# Patient Record
Sex: FEMALE | Race: WHITE | NOT HISPANIC OR LATINO | RURAL
[De-identification: names, ages, dates, MRNs, and addresses within clinical notes are randomized per-mention and may not be internally consistent; named-entity substitution may affect disease eponyms.]

---

## 2024-04-24 ENCOUNTER — OFFICE VISIT (OUTPATIENT)
Dept: OBSTETRICS AND GYNECOLOGY | Facility: CLINIC | Age: 34
End: 2024-04-24
Payer: COMMERCIAL

## 2024-04-24 VITALS
DIASTOLIC BLOOD PRESSURE: 86 MMHG | WEIGHT: 123 LBS | HEART RATE: 91 BPM | OXYGEN SATURATION: 99 % | BODY MASS INDEX: 22.63 KG/M2 | HEIGHT: 62 IN | SYSTOLIC BLOOD PRESSURE: 119 MMHG

## 2024-04-24 DIAGNOSIS — Z30.41 ENCOUNTER FOR SURVEILLANCE OF CONTRACEPTIVE PILLS: ICD-10-CM

## 2024-04-24 DIAGNOSIS — Z12.4 CERVICAL CANCER SCREENING: ICD-10-CM

## 2024-04-24 DIAGNOSIS — Z01.419 ROUTINE GYNECOLOGICAL EXAMINATION: Primary | ICD-10-CM

## 2024-04-24 PROCEDURE — 3074F SYST BP LT 130 MM HG: CPT | Mod: ,,, | Performed by: OBSTETRICS & GYNECOLOGY

## 2024-04-24 PROCEDURE — 87624 HPV HI-RISK TYP POOLED RSLT: CPT | Mod: ,,, | Performed by: CLINICAL MEDICAL LABORATORY

## 2024-04-24 PROCEDURE — 99395 PREV VISIT EST AGE 18-39: CPT | Mod: ,,, | Performed by: OBSTETRICS & GYNECOLOGY

## 2024-04-24 PROCEDURE — 1159F MED LIST DOCD IN RCRD: CPT | Mod: ,,, | Performed by: OBSTETRICS & GYNECOLOGY

## 2024-04-24 PROCEDURE — 3079F DIAST BP 80-89 MM HG: CPT | Mod: ,,, | Performed by: OBSTETRICS & GYNECOLOGY

## 2024-04-24 PROCEDURE — 88142 CYTOPATH C/V THIN LAYER: CPT | Mod: TC,GCY | Performed by: OBSTETRICS & GYNECOLOGY

## 2024-04-24 PROCEDURE — 3008F BODY MASS INDEX DOCD: CPT | Mod: ,,, | Performed by: OBSTETRICS & GYNECOLOGY

## 2024-04-24 RX ORDER — NORGESTIMATE AND ETHINYL ESTRADIOL 0.25-0.035
1 KIT ORAL DAILY
Qty: 30 TABLET | Refills: 11 | Status: SHIPPED | OUTPATIENT
Start: 2024-04-24 | End: 2025-04-24

## 2024-04-24 NOTE — PROGRESS NOTES
"CC: Well woman exam    Clemencia Bedolla is a 33 y.o. female  presents for well woman exam.    LMP: Patient's last menstrual period was 2024 (approximate)..    Last mammogram: n/a  Last Colonoscopy: n/a    Denies any issues, problems, or complaints.     Past Medical History:   Diagnosis Date    Anemia      Past Surgical History:   Procedure Laterality Date    DILATION AND CURETTAGE OF UTERUS      REDUCTION OF BOTH BREASTS      REPAIR, LIGAMENT, KNEE, COLLATERAL, ACL, OR PCL       Social History     Socioeconomic History    Marital status:    Tobacco Use    Smoking status: Never     Passive exposure: Never    Smokeless tobacco: Never   Substance and Sexual Activity    Alcohol use: Never    Drug use: Never    Sexual activity: Yes     Partners: Male     Family History   Problem Relation Name Age of Onset    Cancer Paternal Grandfather      Cancer Maternal Grandfather      Epilepsy Mother       OB History          6    Para   3    Term   3            AB   3    Living   3         SAB   3    IAB        Ectopic        Multiple        Live Births   3                 /86   Pulse 91   Ht 5' 2" (1.575 m)   Wt 55.8 kg (123 lb)   LMP 2024 (Approximate)   SpO2 99%   BMI 22.50 kg/m²       ROS:  GENERAL: Denies weight gain or weight loss. Feeling well overall.   SKIN: Denies rash or lesions.   HEAD: Denies head injury or headache.   NODES: Denies enlarged lymph nodes.   CHEST: Denies chest pain or shortness of breath.   CARDIOVASCULAR: Denies palpitations or left sided chest pain.   ABDOMEN: No abdominal pain, constipation, diarrhea, nausea, vomiting or rectal bleeding.   URINARY: No frequency, dysuria, hematuria, or burning on urination.  REPRODUCTIVE: See HPI.   BREASTS: The patient performs breast self-examination and denies pain, lumps, or nipple discharge.   HEMATOLOGIC: No easy bruisability or excessive bleeding.   MUSCULOSKELETAL: Denies joint pain or swelling.   NEUROLOGIC: " Denies syncope or weakness.   PSYCHIATRIC: Denies depression, anxiety or mood swings.    PHYSICAL EXAM:  APPEARANCE: Well nourished, well developed, in no acute distress.  AFFECT: WNL, alert and oriented x 3  SKIN: No acne or hirsutism  NECK: Neck symmetric without masses or thyromegaly  NODES: No inguinal, cervical, axillary, or femoral lymph node enlargement  CHEST: Good respiratory effect  ABDOMEN: Soft.  No tenderness or masses.  No hepatosplenomegaly.  No hernias.  BREASTS: Symmetrical, no skin changes or visible lesions.  No palpable masses, nipple discharge bilaterally.  PELVIC: Normal external genitalia without lesions.  Normal hair distribution.  Adequate perineal body, normal urethral meatus.  Vagina moist and well rugated without lesions or discharge.  Cervix pink, without lesions, discharge or tenderness.  No significant cystocele or rectocele.  Bimanual exam shows uterus to be normal size, regular, mobile and nontender.  Adnexa without masses or tenderness.    EXTREMITIES: No edema.    Routine gynecological examination  -     ThinPrep Pap Test; Future; Expected date: 04/24/2024    Cervical cancer screening  -     ThinPrep Pap Test; Future; Expected date: 04/24/2024    Encounter for surveillance of contraceptive pills  -     norgestimate-ethinyl estradioL (ORTHO-CYCLEN) 0.25-35 mg-mcg per tablet; Take 1 tablet by mouth once daily.  Dispense: 30 tablet; Refill: 11            Patient was counseled today on A.C.S. Pap guidelines and recommendations for yearly pelvic exams, mammograms and monthly self breast exams; to see her PCP for other health maintenance.   Exercise regimen encouraged  Healthy food choices encouraged  Questions answered to desired level of satisfaction  Verbalized understanding to all information and instructions    Follow up in about 1 year (around 4/24/2025) for annual.      Kylee Cano M.D., FCOG    OB/GYN

## 2024-04-26 LAB
GH SERPL-MCNC: NORMAL NG/ML
INSULIN SERPL-ACNC: NORMAL U[IU]/ML
LAB AP CLINICAL INFORMATION: NORMAL
LAB AP GYN INTERPRETATION: NEGATIVE
LAB AP PAP DISCLAIMER COMMENTS: NORMAL
RENIN PLAS-CCNC: NORMAL NG/ML/H

## 2024-04-27 LAB
HPV 16: NEGATIVE
HPV 18: NEGATIVE
HPV OTHER: NEGATIVE

## 2024-08-29 DIAGNOSIS — Z36.89 ENCOUNTER TO ESTABLISH GESTATIONAL AGE USING ULTRASOUND: Primary | ICD-10-CM

## 2024-09-11 ENCOUNTER — OFFICE VISIT (OUTPATIENT)
Dept: OBSTETRICS AND GYNECOLOGY | Facility: CLINIC | Age: 34
End: 2024-09-11
Payer: COMMERCIAL

## 2024-09-11 ENCOUNTER — HOSPITAL ENCOUNTER (OUTPATIENT)
Dept: RADIOLOGY | Facility: HOSPITAL | Age: 34
Discharge: HOME | End: 2024-09-11
Attending: STUDENT IN AN ORGANIZED HEALTH CARE EDUCATION/TRAINING PROGRAM
Payer: COMMERCIAL

## 2024-09-11 VITALS
SYSTOLIC BLOOD PRESSURE: 123 MMHG | DIASTOLIC BLOOD PRESSURE: 72 MMHG | HEIGHT: 62 IN | BODY MASS INDEX: 23.59 KG/M2 | WEIGHT: 128.19 LBS | HEART RATE: 78 BPM

## 2024-09-11 DIAGNOSIS — Z3A.09 9 WEEKS GESTATION OF PREGNANCY: ICD-10-CM

## 2024-09-11 DIAGNOSIS — O09.291 HISTORY OF MISCARRIAGE, CURRENTLY PREGNANT, FIRST TRIMESTER: Primary | ICD-10-CM

## 2024-09-11 DIAGNOSIS — Z11.3 SCREEN FOR STD (SEXUALLY TRANSMITTED DISEASE): ICD-10-CM

## 2024-09-11 DIAGNOSIS — Z36.89 ENCOUNTER TO ESTABLISH GESTATIONAL AGE USING ULTRASOUND: ICD-10-CM

## 2024-09-11 PROCEDURE — 76801 OB US < 14 WKS SINGLE FETUS: CPT | Mod: TC

## 2024-09-11 PROCEDURE — 3074F SYST BP LT 130 MM HG: CPT | Mod: ,,, | Performed by: STUDENT IN AN ORGANIZED HEALTH CARE EDUCATION/TRAINING PROGRAM

## 2024-09-11 PROCEDURE — 99999 PR PBB SHADOW E&M-EST. PATIENT-LVL III: CPT | Mod: PBBFAC,,, | Performed by: STUDENT IN AN ORGANIZED HEALTH CARE EDUCATION/TRAINING PROGRAM

## 2024-09-11 PROCEDURE — 80307 DRUG TEST PRSMV CHEM ANLYZR: CPT | Mod: ,,, | Performed by: CLINICAL MEDICAL LABORATORY

## 2024-09-11 PROCEDURE — 87491 CHLMYD TRACH DNA AMP PROBE: CPT | Mod: ,,, | Performed by: CLINICAL MEDICAL LABORATORY

## 2024-09-11 PROCEDURE — 3078F DIAST BP <80 MM HG: CPT | Mod: ,,, | Performed by: STUDENT IN AN ORGANIZED HEALTH CARE EDUCATION/TRAINING PROGRAM

## 2024-09-11 PROCEDURE — 3008F BODY MASS INDEX DOCD: CPT | Mod: ,,, | Performed by: STUDENT IN AN ORGANIZED HEALTH CARE EDUCATION/TRAINING PROGRAM

## 2024-09-11 PROCEDURE — 0500F INITIAL PRENATAL CARE VISIT: CPT | Mod: S$PBB,,, | Performed by: STUDENT IN AN ORGANIZED HEALTH CARE EDUCATION/TRAINING PROGRAM

## 2024-09-11 PROCEDURE — 87661 TRICHOMONAS VAGINALIS AMPLIF: CPT | Mod: ,,, | Performed by: CLINICAL MEDICAL LABORATORY

## 2024-09-11 PROCEDURE — 87086 URINE CULTURE/COLONY COUNT: CPT | Mod: ,,, | Performed by: CLINICAL MEDICAL LABORATORY

## 2024-09-11 PROCEDURE — 99213 OFFICE O/P EST LOW 20 MIN: CPT | Mod: PBBFAC,25 | Performed by: STUDENT IN AN ORGANIZED HEALTH CARE EDUCATION/TRAINING PROGRAM

## 2024-09-11 PROCEDURE — 87591 N.GONORRHOEAE DNA AMP PROB: CPT | Mod: ,,, | Performed by: CLINICAL MEDICAL LABORATORY

## 2024-09-12 LAB
AMPHET UR QL SCN: NEGATIVE
BARBITURATES UR QL SCN: NEGATIVE
BENZODIAZ METAB UR QL SCN: NEGATIVE
CANNABINOIDS UR QL SCN: NEGATIVE
CHLAMYDIA BY PCR: NEGATIVE
COCAINE UR QL SCN: NEGATIVE
N. GONORRHOEAE (GC) BY PCR: NEGATIVE
OPIATES UR QL SCN: NEGATIVE
PCP UR QL SCN: NEGATIVE
TRICHOMONAS NAT: NEGATIVE

## 2024-09-12 NOTE — PROGRESS NOTES
New OB History and Physical    CC:   Chief Complaint   Patient presents with    pregnancy confirmation     Pt states no concerns at present.         Assessment/Plan:   Clemencia Bedolla is a 34 y.o. at 9w1d who presents for new OB visit    Problem List Items Addressed This Visit    None  Visit Diagnoses       History of miscarriage, currently pregnant, first trimester    -  Primary    Relevant Orders     OB Follow-up Ea Gestation Transabd    Varicella Zoster Antibody, IgG    Hepatitis C Antibody (Completed)    HIV 1/2 Ag/Ab (4th Gen) (Completed)    Type & Screen    Urine culture (Completed)    Treponema Pallidum (Syphillis) Antibody (Completed)    Rubella Antibody Screen (Completed)    Hepatitis B Surface Antigen (Completed)    Basic Metabolic Panel (Completed)    CBC Auto Differential (Completed)    Drug Screen, Urine    9 weeks gestation of pregnancy        Relevant Orders    Varicella Zoster Antibody, IgG    Hepatitis C Antibody (Completed)    HIV 1/2 Ag/Ab (4th Gen) (Completed)    Type & Screen    Urine culture (Completed)    Treponema Pallidum (Syphillis) Antibody (Completed)    Rubella Antibody Screen (Completed)    Hepatitis B Surface Antigen (Completed)    Basic Metabolic Panel (Completed)    CBC Auto Differential (Completed)    Drug Screen, Urine    Screen for STD (sexually transmitted disease)        Relevant Orders    Trichomonas vaginalis by PCR (Completed)    Chlamydia/GC, PCR (Completed)    Drug Screen, Urine            HPI: Clemencia Bedolla is a 34 y.o. at 9w1d who presents for new OB visit.   History of 3 previous miscarriages, most recent two approx 9-10 weeks. 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    Review of Systems: The following ROS was otherwise negative, except as noted in the HPI:  constitutional, HEENT, respiratory, cardiovascular, gastrointestinal, genitourinary, skin, musculoskeletal, neurological, psych    Gynecologic History: Denies hx of abnl pap smears.  No hx  "of STIs.    Obstetrical History:  OB History          7    Para   3    Term   3            AB   3    Living   3         SAB   3    IAB        Ectopic        Multiple        Live Births   3                 Past Medical History:   Past Medical History:   Diagnosis Date    Anemia        Medications:  Medication List with Changes/Refills   Discontinued Medications    NORGESTIMATE-ETHINYL ESTRADIOL (ORTHO-CYCLEN) 0.25-35 MG-MCG PER TABLET    Take 1 tablet by mouth once daily.         Allergies:  Sulfa (sulfonamide antibiotics)    Surgical History:  Past Surgical History:   Procedure Laterality Date    DILATION AND CURETTAGE OF UTERUS      REDUCTION OF BOTH BREASTS      REPAIR, LIGAMENT, KNEE, COLLATERAL, ACL, OR PCL         Family History:  Family History   Problem Relation Name Age of Onset    Cancer Paternal Grandfather      Cancer Maternal Grandfather      Epilepsy Mother         Social History:  Social History     Substance and Sexual Activity   Alcohol Use Never     Social History     Substance and Sexual Activity   Drug Use Never     Social History     Tobacco Use   Smoking Status Never    Passive exposure: Never   Smokeless Tobacco Never       Physical Exam:  /72 (BP Location: Right arm, Patient Position: Sitting)   Pulse 78   Ht 5' 2" (1.575 m)   Wt 58.2 kg (128 lb 3.2 oz)   BMI 23.45 kg/m²     General: Alert, well appearing, no acute distress  Head: Normocephalic, atraumatic  Lungs: unlabored respirations  Abdomen: Gravid, soft, nontender   Pelvic: deferred  Extremities: No redness or tenderness  Skin: Well perfused, normal coloration and turgor, no lesions or rashes visualized  Neuro: Alert, oriented, normal speech, no focal deficits, moves extremities appropriately  Psych: Appropriate, normal affect, appears stated age  Osteopathic: No TART changes      Reviewed frequency of appointments for routine prenatal care, call group partners.  Pregnancy book given, encouraged to read through " for questions regarding food/drink and medication safety in pregnancy.  Encouraged Mychart access.  Instructed to stop by the lab after visit for NOB labwork.    Breastfeeding  - Discussed benefits of breastfeeding for mother and baby and limitations of formula  - Educated mother on milk and milk production  - Encouraged to feed infant on demand  - Needs 8-12 feeds in 24 hrs  - Does not need to go more then 4-5 hours with out a feed  - Reviewed feeding cues, feeding patterns and positions  - Encouraged patient to review pregnancy guide for additional information

## 2024-09-13 ENCOUNTER — PATIENT MESSAGE (OUTPATIENT)
Dept: OBSTETRICS AND GYNECOLOGY | Facility: CLINIC | Age: 34
End: 2024-09-13
Payer: COMMERCIAL

## 2024-09-13 LAB — UA COMPLETE W REFLEX CULTURE PNL UR: ABNORMAL

## 2024-09-23 ENCOUNTER — TELEPHONE (OUTPATIENT)
Dept: OBSTETRICS AND GYNECOLOGY | Facility: CLINIC | Age: 34
End: 2024-09-23
Payer: COMMERCIAL

## 2024-09-23 NOTE — TELEPHONE ENCOUNTER
----- Message from Lory Tirado sent at 9/23/2024 12:42 PM CDT -----  Pt saw that [facility] results are in but she can't get into them on her yancy. Please call  [phone]

## 2024-10-10 ENCOUNTER — ROUTINE PRENATAL (OUTPATIENT)
Dept: OBSTETRICS AND GYNECOLOGY | Facility: CLINIC | Age: 34
End: 2024-10-10
Attending: STUDENT IN AN ORGANIZED HEALTH CARE EDUCATION/TRAINING PROGRAM
Payer: COMMERCIAL

## 2024-10-10 ENCOUNTER — HOSPITAL ENCOUNTER (OUTPATIENT)
Dept: RADIOLOGY | Facility: HOSPITAL | Age: 34
Discharge: HOME | End: 2024-10-10
Attending: STUDENT IN AN ORGANIZED HEALTH CARE EDUCATION/TRAINING PROGRAM
Payer: COMMERCIAL

## 2024-10-10 VITALS
SYSTOLIC BLOOD PRESSURE: 120 MMHG | DIASTOLIC BLOOD PRESSURE: 69 MMHG | HEART RATE: 73 BPM | WEIGHT: 130 LBS | BODY MASS INDEX: 23.78 KG/M2

## 2024-10-10 DIAGNOSIS — O09.291 HISTORY OF MISCARRIAGE, CURRENTLY PREGNANT, FIRST TRIMESTER: Primary | ICD-10-CM

## 2024-10-10 DIAGNOSIS — R35.0 FREQUENCY OF URINATION: ICD-10-CM

## 2024-10-10 DIAGNOSIS — Z3A.13 13 WEEKS GESTATION OF PREGNANCY: ICD-10-CM

## 2024-10-10 DIAGNOSIS — O09.291 HISTORY OF MISCARRIAGE, CURRENTLY PREGNANT, FIRST TRIMESTER: ICD-10-CM

## 2024-10-10 LAB
BILIRUB SERPL-MCNC: NORMAL MG/DL
BLOOD URINE, POC: NORMAL
CLARITY, UA: NORMAL
COLOR, UA: NORMAL
GLUCOSE UR QL STRIP: NORMAL
KETONES UR QL STRIP: NORMAL
LEUKOCYTE ESTERASE URINE, POC: NORMAL
NITRITE, POC UA: NORMAL
PH, POC UA: 7
PROTEIN, POC: NORMAL
SPECIFIC GRAVITY, POC UA: 1
UROBILINOGEN, POC UA: 0.2

## 2024-10-10 PROCEDURE — 76816 OB US FOLLOW-UP PER FETUS: CPT | Mod: TC

## 2024-10-10 PROCEDURE — 0502F SUBSEQUENT PRENATAL CARE: CPT | Mod: ,,, | Performed by: STUDENT IN AN ORGANIZED HEALTH CARE EDUCATION/TRAINING PROGRAM

## 2024-10-10 PROCEDURE — 99999PBSHW POCT URINALYSIS W/O SCOPE: Mod: PBBFAC,,,

## 2024-10-10 PROCEDURE — 99999 PR PBB SHADOW E&M-EST. PATIENT-LVL III: CPT | Mod: PBBFAC,,, | Performed by: STUDENT IN AN ORGANIZED HEALTH CARE EDUCATION/TRAINING PROGRAM

## 2024-10-10 PROCEDURE — 99213 OFFICE O/P EST LOW 20 MIN: CPT | Mod: PBBFAC,25 | Performed by: STUDENT IN AN ORGANIZED HEALTH CARE EDUCATION/TRAINING PROGRAM

## 2024-10-10 PROCEDURE — 81003 URINALYSIS AUTO W/O SCOPE: CPT | Mod: PBBFAC | Performed by: STUDENT IN AN ORGANIZED HEALTH CARE EDUCATION/TRAINING PROGRAM

## 2024-10-10 NOTE — PROGRESS NOTES
Return OB Visit    34 y.o. female  at 13w3d   She has no complaints. Denies any vaginal bleeding, leakage of fluid, cramping, contractions, or pressure.   Total weight gain/weight loss in pregnancy: Not found.     Vitals  BP: 120/69  Pulse: 73  Weight: 59 kg (130 lb)  Prenatal  Fetal Heart Rate: 157    Prenatal Labs:  Lab Results   Component Value Date    HGB 11.9 (L) 2024    HCT 36.4 (L) 2024     2024    HEPBSAG Non-Reactive 2024    [id number] Non-Reactive 2024    LABNGO Negative 2024    LABURIN 20,000 Yeast (A) 2024       A: 13w3d           ICD-10-CM ICD-9-CM    1. History of miscarriage, currently pregnant, first trimester  O09.291 V23.2       2. 13 weeks gestation of pregnancy  Z3A.13 V22.2       3. Frequency of urination  R35.0 788.41 POCT URINALYSIS W/O SCOPE          P: Bleeding, daily fetal kick counts, and  labor/labor precautions discussed.    The following were addressed during this visit:    13-16 Weeks  - Breastfeeding Concerns & Resources   - Importance of Early Skin to Skin Contact       Orders Placed This Encounter   Procedures    POCT URINALYSIS W/O SCOPE       Questions answered to desired level of satisfaction  Verbalized understanding to all information and instructions provided.  Follow up in about 4 weeks (around 2024) for OBV.    Alyse Boswell, DO [facility]BG[facility]

## 2024-11-06 ENCOUNTER — ROUTINE PRENATAL (OUTPATIENT)
Dept: OBSTETRICS AND GYNECOLOGY | Facility: CLINIC | Age: 34
End: 2024-11-06
Payer: COMMERCIAL

## 2024-11-06 VITALS
HEART RATE: 83 BPM | DIASTOLIC BLOOD PRESSURE: 60 MMHG | BODY MASS INDEX: 24.87 KG/M2 | WEIGHT: 136 LBS | SYSTOLIC BLOOD PRESSURE: 123 MMHG

## 2024-11-06 DIAGNOSIS — Z3A.17 17 WEEKS GESTATION OF PREGNANCY: ICD-10-CM

## 2024-11-06 DIAGNOSIS — Z36.89 ENCOUNTER FOR FETAL ANATOMIC SURVEY: ICD-10-CM

## 2024-11-06 DIAGNOSIS — O09.292 HISTORY OF MISCARRIAGE, CURRENTLY PREGNANT, SECOND TRIMESTER: Primary | ICD-10-CM

## 2024-11-06 LAB
BILIRUB SERPL-MCNC: NORMAL MG/DL
BLOOD URINE, POC: NORMAL
CLARITY, UA: NORMAL
COLOR, UA: NORMAL
GLUCOSE UR QL STRIP: NORMAL
KETONES UR QL STRIP: NORMAL
LEUKOCYTE ESTERASE URINE, POC: NORMAL
NITRITE, POC UA: NORMAL
PH, POC UA: 6.5
PROTEIN, POC: NORMAL
SPECIFIC GRAVITY, POC UA: 1.02
UROBILINOGEN, POC UA: 0.2

## 2024-11-06 PROCEDURE — 99213 OFFICE O/P EST LOW 20 MIN: CPT | Mod: PBBFAC | Performed by: STUDENT IN AN ORGANIZED HEALTH CARE EDUCATION/TRAINING PROGRAM

## 2024-11-06 PROCEDURE — 99999 PR PBB SHADOW E&M-EST. PATIENT-LVL III: CPT | Mod: PBBFAC,,, | Performed by: STUDENT IN AN ORGANIZED HEALTH CARE EDUCATION/TRAINING PROGRAM

## 2024-11-06 PROCEDURE — 0502F SUBSEQUENT PRENATAL CARE: CPT | Mod: ,,, | Performed by: STUDENT IN AN ORGANIZED HEALTH CARE EDUCATION/TRAINING PROGRAM

## 2024-11-06 RX ORDER — TERCONAZOLE 8 MG/G
1 CREAM VAGINAL NIGHTLY
Qty: 20 G | Refills: 0 | Status: SHIPPED | OUTPATIENT
Start: 2024-11-06 | End: 2024-11-09

## 2024-11-14 PROBLEM — O09.292 HISTORY OF MISCARRIAGE, CURRENTLY PREGNANT, SECOND TRIMESTER: Status: ACTIVE | Noted: 2024-11-14

## 2024-11-14 NOTE — PROGRESS NOTES
Return OB Visit    34 y.o. female  at 17w2d   She c/o none  Reports fetal movement or fluttering. Denies any vaginal bleeding, leakage of fluid, cramping, contractions, or pressure.   Total weight gain/weight loss in pregnancy: Not found.     Vitals  BP: 123/60  Pulse: 83  Weight: 61.7 kg (136 lb)  Prenatal  Fetal Heart Rate: 150  Movement: Present    Prenatal Labs:  Lab Results   Component Value Date    HGB 11.9 (L) 2024    HCT 36.4 (L) 2024     2024    HEPBSAG Non-Reactive 2024    [id number] Non-Reactive 2024    LABNGO Negative 2024    LABURIN 20,000 Yeast (A) 2024       A: 17w2d           ICD-10-CM ICD-9-CM    1. History of miscarriage, currently pregnant, second trimester  O09.292 V23.2       2. 17 weeks gestation of pregnancy  Z3A.17 V22.2 POCT URINALYSIS W/O SCOPE      3. Encounter for fetal anatomic survey  Z36.89 V28.81 US OB/GYN Procedure (Viewpoint) - Extended List - Future          P: Bleeding, daily fetal kick counts, and  labor/labor precautions discussed.    No pregnancy checklist tasks were completed during this visit, and no tasks are pending completion.      Orders Placed This Encounter   Procedures    POCT URINALYSIS W/O SCOPE    US OB/GYN Procedure (Viewpoint) - Extended List - Future     Standing Status:   Future     Standing Expiration Date:   2025     Order Specific Question:   MIKIE:     Answer:   2025     Order Specific Question:   Procedures to be performed:     Answer:   Transabdominal US     Order Specific Question:   Release to patient     Answer:   Immediate       Questions answered to desired level of satisfaction  Verbalized understanding to all information and instructions provided.  Follow up in about 4 weeks (around 2024) for MAGALIS, anatomy US.    Alyse Boswell, DO [facility]BG[facility]

## 2024-11-15 ENCOUNTER — HOSPITAL ENCOUNTER (EMERGENCY)
Facility: HOSPITAL | Age: 34
Discharge: HOME | End: 2024-11-15
Attending: OBSTETRICS & GYNECOLOGY
Payer: COMMERCIAL

## 2024-11-15 VITALS
SYSTOLIC BLOOD PRESSURE: 107 MMHG | HEART RATE: 79 BPM | TEMPERATURE: 98 F | HEIGHT: 62 IN | RESPIRATION RATE: 20 BRPM | DIASTOLIC BLOOD PRESSURE: 60 MMHG | WEIGHT: 132.19 LBS | BODY MASS INDEX: 24.33 KG/M2
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
## 2024-11-15 DIAGNOSIS — Z3A.18 18 WEEKS GESTATION OF PREGNANCY: Primary | ICD-10-CM

## 2024-11-15 PROCEDURE — 99284 EMERGENCY DEPT VISIT MOD MDM: CPT
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
## 2024-12-02 ENCOUNTER — HOSPITAL ENCOUNTER (OUTPATIENT)
Dept: OBSTETRICS AND GYNECOLOGY | Facility: CLINIC | Age: 34
Discharge: HOME | End: 2024-12-02
Attending: STUDENT IN AN ORGANIZED HEALTH CARE EDUCATION/TRAINING PROGRAM
Payer: COMMERCIAL

## 2024-12-02 ENCOUNTER — ROUTINE PRENATAL (OUTPATIENT)
Dept: OBSTETRICS AND GYNECOLOGY | Facility: CLINIC | Age: 34
End: 2024-12-02
Attending: STUDENT IN AN ORGANIZED HEALTH CARE EDUCATION/TRAINING PROGRAM
Payer: COMMERCIAL

## 2024-12-02 VITALS
BODY MASS INDEX: 25.2 KG/M2 | HEART RATE: 84 BPM | WEIGHT: 137.81 LBS | DIASTOLIC BLOOD PRESSURE: 80 MMHG | SYSTOLIC BLOOD PRESSURE: 112 MMHG

## 2024-12-02 DIAGNOSIS — Z3A.21 21 WEEKS GESTATION OF PREGNANCY: ICD-10-CM

## 2024-12-02 DIAGNOSIS — Z36.89 ENCOUNTER FOR FETAL ANATOMIC SURVEY: ICD-10-CM

## 2024-12-02 DIAGNOSIS — R35.0 FREQUENCY OF URINATION: ICD-10-CM

## 2024-12-02 DIAGNOSIS — O09.292 HISTORY OF MISCARRIAGE, CURRENTLY PREGNANT, SECOND TRIMESTER: Primary | ICD-10-CM

## 2024-12-02 LAB
BILIRUB SERPL-MCNC: NORMAL MG/DL
BLOOD URINE, POC: NORMAL
CLARITY, UA: CLEAR
COLOR, UA: YELLOW
GLUCOSE UR QL STRIP: NORMAL
KETONES UR QL STRIP: NORMAL
LEUKOCYTE ESTERASE URINE, POC: NORMAL
NITRITE, POC UA: NORMAL
PH, POC UA: 5
PROTEIN, POC: NORMAL
SPECIFIC GRAVITY, POC UA: 1.02
UROBILINOGEN, POC UA: 0.2

## 2024-12-02 PROCEDURE — 0502F SUBSEQUENT PRENATAL CARE: CPT | Mod: ,,, | Performed by: STUDENT IN AN ORGANIZED HEALTH CARE EDUCATION/TRAINING PROGRAM

## 2024-12-02 PROCEDURE — 76805 OB US >/= 14 WKS SNGL FETUS: CPT | Mod: 26,,, | Performed by: OBSTETRICS & GYNECOLOGY

## 2024-12-02 PROCEDURE — 99213 OFFICE O/P EST LOW 20 MIN: CPT | Mod: PBBFAC | Performed by: STUDENT IN AN ORGANIZED HEALTH CARE EDUCATION/TRAINING PROGRAM

## 2024-12-02 PROCEDURE — 99999 PR PBB SHADOW E&M-EST. PATIENT-LVL III: CPT | Mod: PBBFAC,,, | Performed by: STUDENT IN AN ORGANIZED HEALTH CARE EDUCATION/TRAINING PROGRAM

## 2024-12-28 NOTE — PROGRESS NOTES
Return OB Visit    34 y.o. female  at 21w0d   She c/o none  Reports fetal movement or fluttering. Denies any vaginal bleeding, leakage of fluid, cramping, contractions, or pressure.   Total weight gain/weight loss in pregnancy: Not found.     Vitals  BP: 112/80  Pulse: 84  Weight: 62.5 kg (137 lb 12.8 oz)  Edema  LLE Edema: None  RLE Edema: None  Facial: None  Additional Edema?: No  FHTs reassuring    Prenatal Labs:  Lab Results   Component Value Date    HGB 11.9 (L) 2024    HCT 36.4 (L) 2024     2024    HEPBSAG Non-Reactive 2024    [id number] Non-Reactive 2024    LABNGO Negative 2024    LABURIN 20,000 Yeast (A) 2024       A: 21w0d           ICD-10-CM ICD-9-CM    1. History of miscarriage, currently pregnant, second trimester  O09.292 V23.2       2. Encounter for fetal anatomic survey  Z36.89 V28.81 US OB/GYN Procedure (Viewpoint) - Extended List - Future      3. Frequency of urination  R35.0 788.41 POCT URINALYSIS W/O SCOPE      4. 21 weeks gestation of pregnancy  Z3A.21 V22.2           P: Bleeding, daily fetal kick counts, and  labor/labor precautions discussed.    No pregnancy checklist tasks were completed during this visit, and no tasks are pending completion.      Orders Placed This Encounter   Procedures    US OB/GYN Procedure (Viewpoint) - Extended List - Future     Standing Status:   Future     Standing Expiration Date:   2025     Order Specific Question:   MIKIE:     Answer:   2025     Order Specific Question:   Procedures to be performed:     Answer:   Ultrasound Follow-up     Order Specific Question:   Release to patient     Answer:   Immediate       Questions answered to desired level of satisfaction  Verbalized understanding to all information and instructions provided.  Follow up in about 4 weeks (around 2024) for MAGALIS, US anatomy f/u.    Alyse Boswell, DO [facility]BG[facility]

## 2024-12-30 ENCOUNTER — HOSPITAL ENCOUNTER (OUTPATIENT)
Dept: OBSTETRICS AND GYNECOLOGY | Facility: CLINIC | Age: 34
Discharge: HOME | End: 2024-12-30
Attending: STUDENT IN AN ORGANIZED HEALTH CARE EDUCATION/TRAINING PROGRAM
Payer: COMMERCIAL

## 2024-12-30 ENCOUNTER — ROUTINE PRENATAL (OUTPATIENT)
Dept: OBSTETRICS AND GYNECOLOGY | Facility: CLINIC | Age: 34
End: 2024-12-30
Attending: STUDENT IN AN ORGANIZED HEALTH CARE EDUCATION/TRAINING PROGRAM
Payer: COMMERCIAL

## 2024-12-30 VITALS
BODY MASS INDEX: 25.35 KG/M2 | SYSTOLIC BLOOD PRESSURE: 103 MMHG | HEART RATE: 72 BPM | DIASTOLIC BLOOD PRESSURE: 67 MMHG | WEIGHT: 138.63 LBS

## 2024-12-30 DIAGNOSIS — O09.292 HISTORY OF MISCARRIAGE, CURRENTLY PREGNANT, SECOND TRIMESTER: Primary | ICD-10-CM

## 2024-12-30 DIAGNOSIS — R82.998 LEUKOCYTES IN URINE: ICD-10-CM

## 2024-12-30 DIAGNOSIS — Z36.89 ENCOUNTER FOR FETAL ANATOMIC SURVEY: ICD-10-CM

## 2024-12-30 DIAGNOSIS — Z3A.25 25 WEEKS GESTATION OF PREGNANCY: ICD-10-CM

## 2024-12-30 DIAGNOSIS — Z34.83 ENCOUNTER FOR SUPERVISION OF OTHER NORMAL PREGNANCY, THIRD TRIMESTER: ICD-10-CM

## 2024-12-30 LAB
BILIRUB SERPL-MCNC: NORMAL MG/DL
BLOOD URINE, POC: NORMAL
CLARITY, UA: NORMAL
COLOR, UA: NORMAL
GLUCOSE UR QL STRIP: NORMAL
KETONES UR QL STRIP: NORMAL
LEUKOCYTE ESTERASE URINE, POC: NORMAL
NITRITE, POC UA: NORMAL
PH, POC UA: 7.5
PROTEIN, POC: NORMAL
SPECIFIC GRAVITY, POC UA: 1.01
UROBILINOGEN, POC UA: 0.2

## 2024-12-30 PROCEDURE — 99213 OFFICE O/P EST LOW 20 MIN: CPT | Mod: PBBFAC | Performed by: STUDENT IN AN ORGANIZED HEALTH CARE EDUCATION/TRAINING PROGRAM

## 2024-12-30 PROCEDURE — 87086 URINE CULTURE/COLONY COUNT: CPT | Mod: ,,, | Performed by: CLINICAL MEDICAL LABORATORY

## 2024-12-30 PROCEDURE — 99999 PR PBB SHADOW E&M-EST. PATIENT-LVL III: CPT | Mod: PBBFAC,,, | Performed by: STUDENT IN AN ORGANIZED HEALTH CARE EDUCATION/TRAINING PROGRAM

## 2024-12-30 PROCEDURE — 0502F SUBSEQUENT PRENATAL CARE: CPT | Mod: ,,, | Performed by: STUDENT IN AN ORGANIZED HEALTH CARE EDUCATION/TRAINING PROGRAM

## 2024-12-30 NOTE — PROGRESS NOTES
Return OB Visit    34 y.o. female  at 25w0d   She c/o none.  Reports good fetal movement or fluttering. Denies any vaginal bleeding, leakage of fluid, cramping, contractions, or pressure.   Total weight gain/weight loss in pregnancy: Not found.     Vitals  BP: 103/67  Pulse: 72  Weight: 62.9 kg (138 lb 9.6 oz)  Prenatal  Fetal Heart Rate: 145  Movement: Present    Prenatal Labs:  Lab Results   Component Value Date    HGB 11.7 (L) 2024    HCT 36.1 (L) 2024     2024    HEPBSAG Non-Reactive 2024    [id number] Non-Reactive 2024    LABNGO Negative 2024    LABURIN 20,000 Yeast (A) 2024       A: 25w0d           ICD-10-CM ICD-9-CM    1. History of miscarriage, currently pregnant, second trimester  O09.292 V23.2       2. 25 weeks gestation of pregnancy  Z3A.25 V22.2 POCT URINALYSIS W/O SCOPE      3. Encounter for supervision of other normal pregnancy, third trimester  Z34.83 V22.1 Treponema Pallidum (Syphillis) Antibody      CBC Auto Differential      4. Leukocytes in urine  R82.998 791.7 Urine culture          P: Bleeding, daily fetal kick counts, and  labor/labor precautions discussed.    No pregnancy checklist tasks were completed during this visit, and no tasks are pending completion.      Orders Placed This Encounter   Procedures    Urine culture           Order Specific Question:   Send normal result to authorizing provider's In Basket if patient is active on MyChart:     Answer:   Yes    Treponema Pallidum (Syphillis) Antibody     Standing Status:   Future     Number of Occurrences:   1     Standing Expiration Date:   3/27/2026    CBC Auto Differential     Standing Status:   Future     Number of Occurrences:   1     Standing Expiration Date:   3/27/2026     Discussed possible MFM referral due to EIF, will notify office  Questions answered to desired level of satisfaction  Verbalized understanding to all information and instructions provided.  Follow up in  about 3 weeks (around 1/20/2025) for ROB. Alyse Boswell, DO [facility]BG[facility]

## 2025-01-01 LAB — UA COMPLETE W REFLEX CULTURE PNL UR: ABNORMAL

## 2025-01-02 ENCOUNTER — PATIENT MESSAGE (OUTPATIENT)
Dept: OBSTETRICS AND GYNECOLOGY | Facility: CLINIC | Age: 35
End: 2025-01-02
Payer: COMMERCIAL

## 2025-01-21 ENCOUNTER — PATIENT MESSAGE (OUTPATIENT)
Dept: OBSTETRICS AND GYNECOLOGY | Facility: CLINIC | Age: 35
End: 2025-01-21
Payer: COMMERCIAL

## 2025-01-22 ENCOUNTER — ROUTINE PRENATAL (OUTPATIENT)
Dept: OBSTETRICS AND GYNECOLOGY | Facility: CLINIC | Age: 35
End: 2025-01-22
Payer: COMMERCIAL

## 2025-01-22 VITALS
HEART RATE: 83 BPM | BODY MASS INDEX: 26.52 KG/M2 | SYSTOLIC BLOOD PRESSURE: 116 MMHG | DIASTOLIC BLOOD PRESSURE: 66 MMHG | WEIGHT: 145 LBS

## 2025-01-22 DIAGNOSIS — Z34.83 ENCOUNTER FOR SUPERVISION OF OTHER NORMAL PREGNANCY, THIRD TRIMESTER: Primary | ICD-10-CM

## 2025-01-22 DIAGNOSIS — Z3A.28 28 WEEKS GESTATION OF PREGNANCY: ICD-10-CM

## 2025-01-22 DIAGNOSIS — R82.998 LEUKOCYTES IN URINE: ICD-10-CM

## 2025-01-22 DIAGNOSIS — Z3A.28 28 WEEKS GESTATION OF PREGNANCY: Primary | ICD-10-CM

## 2025-01-22 DIAGNOSIS — R35.0 FREQUENCY OF URINATION: ICD-10-CM

## 2025-01-22 DIAGNOSIS — Z87.59 HISTORY OF MISCARRIAGE: ICD-10-CM

## 2025-01-22 LAB
BILIRUB SERPL-MCNC: NORMAL MG/DL
BLOOD URINE, POC: NORMAL
CLARITY, UA: CLEAR
COLOR, UA: YELLOW
GLUCOSE UR QL STRIP: NORMAL
KETONES UR QL STRIP: NORMAL
LEUKOCYTE ESTERASE URINE, POC: NORMAL
NITRITE, POC UA: NORMAL
PH, POC UA: 5
PROTEIN, POC: NORMAL
SPECIFIC GRAVITY, POC UA: 1.01
UROBILINOGEN, POC UA: 0.2

## 2025-01-22 PROCEDURE — 0502F SUBSEQUENT PRENATAL CARE: CPT | Mod: ,,, | Performed by: STUDENT IN AN ORGANIZED HEALTH CARE EDUCATION/TRAINING PROGRAM

## 2025-01-22 PROCEDURE — 87086 URINE CULTURE/COLONY COUNT: CPT | Mod: ,,, | Performed by: CLINICAL MEDICAL LABORATORY

## 2025-01-22 PROCEDURE — 99213 OFFICE O/P EST LOW 20 MIN: CPT | Mod: PBBFAC | Performed by: STUDENT IN AN ORGANIZED HEALTH CARE EDUCATION/TRAINING PROGRAM

## 2025-01-22 PROCEDURE — 99999 PR PBB SHADOW E&M-EST. PATIENT-LVL III: CPT | Mod: PBBFAC,,, | Performed by: STUDENT IN AN ORGANIZED HEALTH CARE EDUCATION/TRAINING PROGRAM

## 2025-01-22 PROCEDURE — 87106 FUNGI IDENTIFICATION YEAST: CPT | Mod: ,,, | Performed by: CLINICAL MEDICAL LABORATORY

## 2025-01-23 ENCOUNTER — TELEPHONE (OUTPATIENT)
Dept: OBSTETRICS AND GYNECOLOGY | Facility: CLINIC | Age: 35
End: 2025-01-23
Payer: COMMERCIAL

## 2025-01-23 ENCOUNTER — PATIENT MESSAGE (OUTPATIENT)
Dept: OBSTETRICS AND GYNECOLOGY | Facility: CLINIC | Age: 35
End: 2025-01-23
Payer: COMMERCIAL

## 2025-01-23 RX ORDER — TERCONAZOLE 8 MG/G
1 CREAM VAGINAL NIGHTLY
Qty: 20 G | Refills: 0 | Status: SHIPPED | OUTPATIENT
Start: 2025-01-23 | End: 2025-01-26

## 2025-01-23 NOTE — TELEPHONE ENCOUNTER
----- Message from Yanira sent at 1/21/2025  8:53 AM CST -----  Who Called: Clemencia Bedolla        Preferred Method of Contact: Phone Call  Patient's Preferred Phone Number on File: [phone]     Additional Information: pt needs more info about glucose test on tomorrow. Please provide pt with more info

## 2025-01-24 LAB — UA COMPLETE W REFLEX CULTURE PNL UR: ABNORMAL

## 2025-01-27 ENCOUNTER — TELEPHONE (OUTPATIENT)
Dept: OBSTETRICS AND GYNECOLOGY | Facility: CLINIC | Age: 35
End: 2025-01-27
Payer: COMMERCIAL

## 2025-01-27 ENCOUNTER — PATIENT MESSAGE (OUTPATIENT)
Dept: OBSTETRICS AND GYNECOLOGY | Facility: CLINIC | Age: 35
End: 2025-01-27
Payer: COMMERCIAL

## 2025-01-27 DIAGNOSIS — O99.810 ABNORMAL O'SULLIVAN GLUCOSE CHALLENGE TEST, ANTEPARTUM: Primary | ICD-10-CM

## 2025-01-27 NOTE — TELEPHONE ENCOUNTER
----- Message from Ursula sent at 1/27/2025 10:45 AM CST -----  Who Called: Clemencia Bedolla  Preferred Method of Contact: Phone Call  Patient's Preferred Phone Number on File: [phone]   Best Call Back Number, if different:  Additional Information: pt stated that she failed her 1 hour glucose test and needs to schedule her 3 hour and said she can not come tomorrow or Thursday

## 2025-01-27 NOTE — PROGRESS NOTES
Return OB Visit    34 y.o. female  at 28w2d   She c/o none.  Reports good fetal movement or fluttering. Denies any vaginal bleeding, leakage of fluid, cramping, contractions, or pressure.   Total weight gain/weight loss in pregnancy: Not found.     Vitals  BP: 116/66  Pulse: 83  Weight: 65.8 kg (145 lb)  Prenatal  Fetal Heart Rate: 130  Movement: Present    Prenatal Labs:  Lab Results   Component Value Date    HGB 11.0 (L) 2025    HCT 33.8 (L) 2025     2025    HEPBSAG Non-Reactive 2024    [id number] Non-Reactive 2024    LABNGO Negative 2024    LABURIN 30,000 Yeast species (A) 2025       A: 28w2d           ICD-10-CM ICD-9-CM    1. Encounter for supervision of other normal pregnancy, third trimester  Z34.83 V22.1       2. History of miscarriage  Z87.59 V13.29       3. 28 weeks gestation of pregnancy  Z3A.28 V22.2       4. Frequency of urination  R35.0 788.41 POCT URINALYSIS W/O SCOPE      5. Leukocytes in urine  R82.998 791.7 Urine Culture High Risk          P: Bleeding, daily fetal kick counts, and  labor/labor precautions discussed.    No pregnancy checklist tasks were completed during this visit, and no tasks are pending completion.      Orders Placed This Encounter   Procedures    Urine Culture High Risk           Order Specific Question:   Send normal result to authorizing provider's In Basket if patient is active on MyChart:     Answer:   Yes       Questions answered to desired level of satisfaction  Verbalized understanding to all information and instructions provided.  Follow up in about 2 weeks (around 2025) for ROB. Alyse Boswell, DO [facility]

## 2025-02-05 ENCOUNTER — ROUTINE PRENATAL (OUTPATIENT)
Dept: OBSTETRICS AND GYNECOLOGY | Facility: CLINIC | Age: 35
End: 2025-02-05
Payer: COMMERCIAL

## 2025-02-05 VITALS
HEART RATE: 83 BPM | WEIGHT: 146 LBS | BODY MASS INDEX: 26.7 KG/M2 | SYSTOLIC BLOOD PRESSURE: 113 MMHG | DIASTOLIC BLOOD PRESSURE: 63 MMHG

## 2025-02-05 DIAGNOSIS — Z87.59 HISTORY OF MISCARRIAGE: ICD-10-CM

## 2025-02-05 DIAGNOSIS — R35.0 FREQUENCY OF URINATION: ICD-10-CM

## 2025-02-05 DIAGNOSIS — Z34.83 ENCOUNTER FOR SUPERVISION OF OTHER NORMAL PREGNANCY, THIRD TRIMESTER: Primary | ICD-10-CM

## 2025-02-05 DIAGNOSIS — Z3A.30 30 WEEKS GESTATION OF PREGNANCY: ICD-10-CM

## 2025-02-05 LAB
BILIRUB SERPL-MCNC: NORMAL MG/DL
BLOOD URINE, POC: NORMAL
CLARITY, UA: NORMAL
COLOR, UA: NORMAL
GLUCOSE UR QL STRIP: NORMAL
KETONES UR QL STRIP: NORMAL
LEUKOCYTE ESTERASE URINE, POC: NORMAL
NITRITE, POC UA: NORMAL
PH, POC UA: 5
PROTEIN, POC: NORMAL
SPECIFIC GRAVITY, POC UA: 1.02
UROBILINOGEN, POC UA: NORMAL

## 2025-02-05 PROCEDURE — 0502F SUBSEQUENT PRENATAL CARE: CPT | Mod: ,,, | Performed by: STUDENT IN AN ORGANIZED HEALTH CARE EDUCATION/TRAINING PROGRAM

## 2025-02-05 PROCEDURE — 99999 PR PBB SHADOW E&M-EST. PATIENT-LVL III: CPT | Mod: PBBFAC,,, | Performed by: STUDENT IN AN ORGANIZED HEALTH CARE EDUCATION/TRAINING PROGRAM

## 2025-02-05 PROCEDURE — 99213 OFFICE O/P EST LOW 20 MIN: CPT | Mod: PBBFAC | Performed by: STUDENT IN AN ORGANIZED HEALTH CARE EDUCATION/TRAINING PROGRAM

## 2025-02-05 RX ORDER — LANCETS 28 GAUGE
1 EACH MISCELLANEOUS 4 TIMES DAILY
Qty: 120 EACH | Refills: 2 | Status: SHIPPED | OUTPATIENT
Start: 2025-02-05

## 2025-02-05 RX ORDER — INSULIN PUMP SYRINGE, 3 ML
EACH MISCELLANEOUS
Qty: 1 EACH | Refills: 0 | Status: SHIPPED | OUTPATIENT
Start: 2025-02-05

## 2025-02-05 NOTE — PROGRESS NOTES
Return OB Visit    34 y.o. female  at 30w2d   She has no complaints.   Reports good fetal movement or fluttering. Denies any vaginal bleeding, leakage of fluid, cramping, contractions, or pressure.   Total weight gain/weight loss in pregnancy: Not found.     Vitals  BP: 113/63  Pulse: 83  Weight: 66.2 kg (146 lb)  Prenatal  Movement: Present    FHTs reassuring    Prenatal Labs:  Lab Results   Component Value Date    HGB 11.0 (L) 2025    HCT 33.8 (L) 2025     2025    HEPBSAG Non-Reactive 2024    [id number] Non-Reactive 2024    LABNGO Negative 2024    LABURIN 30,000 Yeast species (A) 2025       A: 30w2d           ICD-10-CM ICD-9-CM    1. Encounter for supervision of other normal pregnancy, third trimester  Z34.83 V22.1       2. History of miscarriage  Z87.59 V13.29       3. 30 weeks gestation of pregnancy  Z3A.30 V22.2       4. Frequency of urination  R35.0 788.41 POCT URINALYSIS W/O SCOPE          P: Bleeding, daily fetal kick counts, and  labor/labor precautions discussed.    No pregnancy checklist tasks were completed during this visit, and no tasks are pending completion.      No orders of the defined types were placed in this encounter.      Questions answered to desired level of satisfaction  Verbalized understanding to all information and instructions provided.  Follow up in about 2 weeks (around 2025) for ROB. Alyse Boswell, DO [facility]

## 2025-02-13 ENCOUNTER — PATIENT MESSAGE (OUTPATIENT)
Dept: OBSTETRICS AND GYNECOLOGY | Facility: CLINIC | Age: 35
End: 2025-02-13
Payer: COMMERCIAL

## 2025-02-19 ENCOUNTER — ROUTINE PRENATAL (OUTPATIENT)
Dept: OBSTETRICS AND GYNECOLOGY | Facility: CLINIC | Age: 35
End: 2025-02-19
Payer: COMMERCIAL

## 2025-02-19 VITALS
WEIGHT: 142 LBS | HEART RATE: 88 BPM | BODY MASS INDEX: 25.97 KG/M2 | SYSTOLIC BLOOD PRESSURE: 132 MMHG | DIASTOLIC BLOOD PRESSURE: 69 MMHG

## 2025-02-19 DIAGNOSIS — Z23 NEED FOR TDAP VACCINATION: ICD-10-CM

## 2025-02-19 DIAGNOSIS — Z3A.32 32 WEEKS GESTATION OF PREGNANCY: ICD-10-CM

## 2025-02-19 DIAGNOSIS — Z87.59 HISTORY OF MISCARRIAGE: ICD-10-CM

## 2025-02-19 DIAGNOSIS — R35.0 FREQUENCY OF URINATION: ICD-10-CM

## 2025-02-19 DIAGNOSIS — Z29.11 NEED FOR RSV VACCINATION: ICD-10-CM

## 2025-02-19 DIAGNOSIS — Z34.83 ENCOUNTER FOR SUPERVISION OF OTHER NORMAL PREGNANCY, THIRD TRIMESTER: Primary | ICD-10-CM

## 2025-02-19 LAB
BILIRUB SERPL-MCNC: NORMAL MG/DL
BLOOD URINE, POC: NORMAL
CLARITY, UA: NORMAL
COLOR, UA: NORMAL
GLUCOSE UR QL STRIP: NORMAL
KETONES UR QL STRIP: NORMAL
LEUKOCYTE ESTERASE URINE, POC: NORMAL
NITRITE, POC UA: NORMAL
PH, POC UA: 7.5
PROTEIN, POC: NORMAL
SPECIFIC GRAVITY, POC UA: 1
UROBILINOGEN, POC UA: 0.2

## 2025-02-19 PROCEDURE — 99213 OFFICE O/P EST LOW 20 MIN: CPT | Mod: PBBFAC | Performed by: STUDENT IN AN ORGANIZED HEALTH CARE EDUCATION/TRAINING PROGRAM

## 2025-02-19 PROCEDURE — 90678 RSV VACC PREF BIVALENT IM: CPT | Mod: PBBFAC | Performed by: STUDENT IN AN ORGANIZED HEALTH CARE EDUCATION/TRAINING PROGRAM

## 2025-02-19 PROCEDURE — 0502F SUBSEQUENT PRENATAL CARE: CPT | Mod: ,,, | Performed by: STUDENT IN AN ORGANIZED HEALTH CARE EDUCATION/TRAINING PROGRAM

## 2025-02-19 PROCEDURE — 90472 IMMUNIZATION ADMIN EACH ADD: CPT | Mod: PBBFAC | Performed by: STUDENT IN AN ORGANIZED HEALTH CARE EDUCATION/TRAINING PROGRAM

## 2025-02-19 PROCEDURE — 99999PBSHW PR PBB SHADOW TECHNICAL ONLY FILED TO HB: Mod: PBBFAC,,,

## 2025-02-19 PROCEDURE — 99999 PR PBB SHADOW E&M-EST. PATIENT-LVL III: CPT | Mod: PBBFAC,,, | Performed by: STUDENT IN AN ORGANIZED HEALTH CARE EDUCATION/TRAINING PROGRAM

## 2025-02-19 PROCEDURE — 90471 IMMUNIZATION ADMIN: CPT | Mod: PBBFAC | Performed by: STUDENT IN AN ORGANIZED HEALTH CARE EDUCATION/TRAINING PROGRAM

## 2025-02-19 PROCEDURE — 90715 TDAP VACCINE 7 YRS/> IM: CPT | Mod: PBBFAC | Performed by: STUDENT IN AN ORGANIZED HEALTH CARE EDUCATION/TRAINING PROGRAM

## 2025-02-19 RX ADMIN — RESPIRATORY SYNCYTIAL VIRUS VACCINE 120 MCG: KIT at 04:02

## 2025-02-19 RX ADMIN — CLOSTRIDIUM TETANI TOXOID ANTIGEN (FORMALDEHYDE INACTIVATED), CORYNEBACTERIUM DIPHTHERIAE TOXOID ANTIGEN (FORMALDEHYDE INACTIVATED), BORDETELLA PERTUSSIS TOXOID ANTIGEN (GLUTARALDEHYDE INACTIVATED), BORDETELLA PERTUSSIS FILAMENTOUS HEMAGGLUTININ ANTIGEN (FORMALDEHYDE INACTIVATED), BORDETELLA PERTUSSIS PERTACTIN ANTIGEN, AND BORDETELLA PERTUSSIS FIMBRIAE 2/3 ANTIGEN 0.5 ML: 5; 2; 2.5; 5; 3; 5 INJECTION, SUSPENSION INTRAMUSCULAR at 04:02

## 2025-02-19 NOTE — PROGRESS NOTES
Return OB Visit    34 y.o. female  at 32w2d   She has no complaints.   Reports good fetal movement or fluttering. Denies any vaginal bleeding, leakage of fluid, cramping, contractions, or pressure.   Total weight gain/weight loss in pregnancy: Not found.     Vitals  BP: 132/69  Pulse: 88  Weight: 64.4 kg (142 lb)  Prenatal  Fundal Height (cm): 33 cm  Fetal Heart Rate: 140  Movement: Present    Prenatal Labs:  Lab Results   Component Value Date    HGB 11.0 (L) 2025    HCT 33.8 (L) 2025     2025    HEPBSAG Non-Reactive 2024    [id number] Non-Reactive 2024    LABNGO Negative 2024    LABURIN 30,000 Yeast species (A) 2025       A: 32w2d           ICD-10-CM ICD-9-CM    1. Encounter for supervision of other normal pregnancy, third trimester  Z34.83 V22.1       2. History of miscarriage  Z87.59 V13.29       3. 32 weeks gestation of pregnancy  Z3A.32 V22.2       4. Frequency of urination  R35.0 788.41 POCT URINALYSIS W/O SCOPE      5. Need for RSV vaccination  Z29.11 V04.82 RSV, preF A and preF B(PF) (Abrysvo) vaccine 120 mcg      6. Need for Tdap vaccination  Z23 V06.1 DIPH,PERTUSS(ACEL),TET VAC(PF)(ADULT)(ADACEL)(TDaP)          P: Bleeding, daily fetal kick counts, and  labor/labor precautions discussed.    The following were addressed during this visit:    29-32 Weeks  - Tdap Given   - Circumsision plans   - Birth Plan   - Fetal Kick Counts/PIH/PTL precautions   - Quiet time       No orders of the defined types were placed in this encounter.      Questions answered to desired level of satisfaction  Verbalized understanding to all information and instructions provided.  Follow up in about 2 weeks (around 3/5/2025) for ROB. Alyse Boswell, DO [facility]

## 2025-03-05 ENCOUNTER — ROUTINE PRENATAL (OUTPATIENT)
Dept: OBSTETRICS AND GYNECOLOGY | Facility: CLINIC | Age: 35
End: 2025-03-05
Payer: COMMERCIAL

## 2025-03-05 VITALS
BODY MASS INDEX: 26.52 KG/M2 | SYSTOLIC BLOOD PRESSURE: 101 MMHG | HEART RATE: 96 BPM | WEIGHT: 145 LBS | DIASTOLIC BLOOD PRESSURE: 70 MMHG

## 2025-03-05 DIAGNOSIS — Z87.59 HISTORY OF MISCARRIAGE: ICD-10-CM

## 2025-03-05 DIAGNOSIS — R35.0 FREQUENCY OF URINATION: ICD-10-CM

## 2025-03-05 DIAGNOSIS — Z36.89 ENCOUNTER FOR ULTRASOUND TO ASSESS FETAL GROWTH: ICD-10-CM

## 2025-03-05 DIAGNOSIS — R82.998 LEUKOCYTES IN URINE: ICD-10-CM

## 2025-03-05 DIAGNOSIS — Z3A.34 34 WEEKS GESTATION OF PREGNANCY: ICD-10-CM

## 2025-03-05 DIAGNOSIS — Z34.83 ENCOUNTER FOR SUPERVISION OF OTHER NORMAL PREGNANCY, THIRD TRIMESTER: Primary | ICD-10-CM

## 2025-03-05 LAB
BILIRUB SERPL-MCNC: NORMAL MG/DL
BLOOD URINE, POC: NORMAL
CLARITY, UA: NORMAL
COLOR, UA: NORMAL
GLUCOSE UR QL STRIP: NORMAL
KETONES UR QL STRIP: NORMAL
LEUKOCYTE ESTERASE URINE, POC: NORMAL
NITRITE, POC UA: NORMAL
PH, POC UA: 5
PROTEIN, POC: NORMAL
SPECIFIC GRAVITY, POC UA: 1.01
UROBILINOGEN, POC UA: 0.2

## 2025-03-05 PROCEDURE — 99213 OFFICE O/P EST LOW 20 MIN: CPT | Mod: PBBFAC | Performed by: STUDENT IN AN ORGANIZED HEALTH CARE EDUCATION/TRAINING PROGRAM

## 2025-03-05 PROCEDURE — 99999 PR PBB SHADOW E&M-EST. PATIENT-LVL III: CPT | Mod: PBBFAC,,, | Performed by: STUDENT IN AN ORGANIZED HEALTH CARE EDUCATION/TRAINING PROGRAM

## 2025-03-05 PROCEDURE — 87086 URINE CULTURE/COLONY COUNT: CPT | Mod: ,,, | Performed by: CLINICAL MEDICAL LABORATORY

## 2025-03-05 PROCEDURE — 0502F SUBSEQUENT PRENATAL CARE: CPT | Mod: ,,, | Performed by: STUDENT IN AN ORGANIZED HEALTH CARE EDUCATION/TRAINING PROGRAM

## 2025-03-07 LAB — UA COMPLETE W REFLEX CULTURE PNL UR: NORMAL

## 2025-03-19 ENCOUNTER — HOSPITAL ENCOUNTER (OUTPATIENT)
Dept: OBSTETRICS AND GYNECOLOGY | Facility: CLINIC | Age: 35
Discharge: HOME | End: 2025-03-19
Attending: STUDENT IN AN ORGANIZED HEALTH CARE EDUCATION/TRAINING PROGRAM
Payer: COMMERCIAL

## 2025-03-19 ENCOUNTER — ROUTINE PRENATAL (OUTPATIENT)
Dept: OBSTETRICS AND GYNECOLOGY | Facility: CLINIC | Age: 35
End: 2025-03-19
Payer: COMMERCIAL

## 2025-03-19 VITALS
DIASTOLIC BLOOD PRESSURE: 74 MMHG | BODY MASS INDEX: 26.7 KG/M2 | HEART RATE: 78 BPM | WEIGHT: 146 LBS | SYSTOLIC BLOOD PRESSURE: 116 MMHG

## 2025-03-19 DIAGNOSIS — R35.0 FREQUENCY OF URINATION: ICD-10-CM

## 2025-03-19 DIAGNOSIS — Z34.83 ENCOUNTER FOR SUPERVISION OF OTHER NORMAL PREGNANCY, THIRD TRIMESTER: Primary | ICD-10-CM

## 2025-03-19 DIAGNOSIS — Z36.89 ENCOUNTER FOR ULTRASOUND TO ASSESS FETAL GROWTH: ICD-10-CM

## 2025-03-19 DIAGNOSIS — Z11.3 SCREEN FOR STD (SEXUALLY TRANSMITTED DISEASE): ICD-10-CM

## 2025-03-19 DIAGNOSIS — R82.998 LEUKOCYTES IN URINE: ICD-10-CM

## 2025-03-19 DIAGNOSIS — Z87.59 HISTORY OF MISCARRIAGE: ICD-10-CM

## 2025-03-19 DIAGNOSIS — Z3A.36 36 WEEKS GESTATION OF PREGNANCY: ICD-10-CM

## 2025-03-19 LAB
BILIRUB SERPL-MCNC: NORMAL MG/DL
BLOOD URINE, POC: NORMAL
CLARITY, UA: CLEAR
COLOR, UA: YELLOW
GLUCOSE UR QL STRIP: NORMAL
KETONES UR QL STRIP: NORMAL
LEUKOCYTE ESTERASE URINE, POC: NORMAL
NITRITE, POC UA: NORMAL
PH, POC UA: 7
PROTEIN, POC: NORMAL
SPECIFIC GRAVITY, POC UA: 1.01
UROBILINOGEN, POC UA: 0.2

## 2025-03-19 PROCEDURE — 87653 STREP B DNA AMP PROBE: CPT | Mod: ,,, | Performed by: CLINICAL MEDICAL LABORATORY

## 2025-03-19 PROCEDURE — 87591 N.GONORRHOEAE DNA AMP PROB: CPT | Mod: ,,, | Performed by: CLINICAL MEDICAL LABORATORY

## 2025-03-19 PROCEDURE — 0502F SUBSEQUENT PRENATAL CARE: CPT | Mod: ,,, | Performed by: STUDENT IN AN ORGANIZED HEALTH CARE EDUCATION/TRAINING PROGRAM

## 2025-03-19 PROCEDURE — 99213 OFFICE O/P EST LOW 20 MIN: CPT | Mod: PBBFAC | Performed by: STUDENT IN AN ORGANIZED HEALTH CARE EDUCATION/TRAINING PROGRAM

## 2025-03-19 PROCEDURE — 99999 PR PBB SHADOW E&M-EST. PATIENT-LVL III: CPT | Mod: PBBFAC,,, | Performed by: STUDENT IN AN ORGANIZED HEALTH CARE EDUCATION/TRAINING PROGRAM

## 2025-03-19 PROCEDURE — 76816 OB US FOLLOW-UP PER FETUS: CPT | Mod: 26,,, | Performed by: OBSTETRICS & GYNECOLOGY

## 2025-03-19 PROCEDURE — 87086 URINE CULTURE/COLONY COUNT: CPT | Mod: ,,, | Performed by: CLINICAL MEDICAL LABORATORY

## 2025-03-19 PROCEDURE — 87491 CHLMYD TRACH DNA AMP PROBE: CPT | Mod: ,,, | Performed by: CLINICAL MEDICAL LABORATORY

## 2025-03-20 LAB
CHLAMYDIA BY PCR: NEGATIVE
GROUP B STREP, PCR: POSITIVE
N. GONORRHOEAE (GC) BY PCR: NEGATIVE

## 2025-03-21 LAB — UA COMPLETE W REFLEX CULTURE PNL UR: NORMAL

## 2025-03-24 ENCOUNTER — RESULTS FOLLOW-UP (OUTPATIENT)
Dept: OBSTETRICS AND GYNECOLOGY | Facility: CLINIC | Age: 35
End: 2025-03-24

## 2025-03-24 NOTE — PROGRESS NOTES
Return OB Visit    34 y.o. female  at 34w2d   She c/o dryness, itching.  Reports good fetal movement or fluttering. Denies any vaginal bleeding, leakage of fluid, cramping, contractions, or pressure.   Total weight gain/weight loss in pregnancy: Not found.     Vitals  BP: 101/70  Pulse: 96  Weight: 65.8 kg (145 lb)  Prenatal  Fundal Height (cm): 35 cm  Fetal Heart Rate: 130  Movement: Present    Prenatal Labs:  Lab Results   Component Value Date    HGB 10.9 (L) 2025    HCT 34.4 (L) 2025     2025    HEPBSAG Non-Reactive 2024    [id number] Non-Reactive 2024    LABNGO Negative 2025    LABURIN Skin/Urogenital Vero Isolated, no further workup. 2025       A: 34w6d           ICD-10-CM ICD-9-CM    1. Encounter for supervision of other normal pregnancy, third trimester  Z34.83 V22.1       2. Frequency of urination  R35.0 788.41 POCT URINALYSIS W/O SCOPE      3. History of miscarriage  Z87.59 V13.29       4. 34 weeks gestation of pregnancy  Z3A.34 V22.2       5. Encounter for ultrasound to assess fetal growth  Z36.89 V28.3 US OB/GYN Procedure ([facility]) - Extended List      6. Leukocytes in urine  R82.998 791.7 Urine Culture High Risk          P: Bleeding, daily fetal kick counts, and  labor/labor precautions discussed.    No pregnancy checklist tasks were completed during this visit, and no tasks are pending completion.      Orders Placed This Encounter   Procedures    Urine Culture High Risk           Send normal result to authorizing provider's In Basket if patient is active on MyChart::   Yes    US OB/GYN Procedure ([facility]) - Extended List     Standing Status:   Future     Number of Occurrences:   1     Expiration Date:   3/5/2026     MIKIE::   2025     Procedures to be performed::   Ultrasound Follow-up     Release to patient:   Immediate       Questions answered to desired level of satisfaction  Verbalized understanding to all information and  instructions provided.  Follow up in about 2 weeks (around 3/19/2025) for ryan BLANCAS.    Alyse Boswell, DO [facility]

## 2025-03-25 NOTE — PROGRESS NOTES
Return OB Visit    34 y.o. female  at 36w2d   She c/o none.  Reports good fetal movement or fluttering. Denies any vaginal bleeding, leakage of fluid, cramping, contractions, or pressure.   Total weight gain/weight loss in pregnancy: Not found.     Vitals  BP: 116/74  Pulse: 78  Weight: 66.2 kg (146 lb)  Prenatal  Movement: Present    Prenatal Labs:  Lab Results   Component Value Date    HGB 10.9 (L) 2025    HCT 34.4 (L) 2025     2025    HEPBSAG Non-Reactive 2024    [id number] Non-Reactive 2024    LABNGO Negative 2025    LABURIN Skin/Urogenital Vero Isolated, no further workup. 2025       A: 36w6d           ICD-10-CM ICD-9-CM    1. Encounter for supervision of other normal pregnancy, third trimester  Z34.83 V22.1       2. History of miscarriage  Z87.59 V13.29       3. 36 weeks gestation of pregnancy  Z3A.36 V22.2 Strep B Screen, Antepartum      CBC Auto Differential      4. Frequency of urination  R35.0 788.41 POCT URINALYSIS W/O SCOPE      5. Screen for STD (sexually transmitted disease)  Z11.3 V74.5 Chlamydia/GC, PCR      6. Leukocytes in urine  R82.998 791.7 Urine Culture High Risk          P: Bleeding, daily fetal kick counts, and  labor/labor precautions discussed.    No pregnancy checklist tasks were completed during this visit, and no tasks are pending completion.      Orders Placed This Encounter   Procedures    Strep B Screen, Antepartum     Allergic to Penicillin:   No    Chlamydia/GC, PCR     Release to patient:   Immediate     Send normal result to authorizing provider's In Basket if patient is active on MyChart::   Yes    Urine Culture High Risk           Send normal result to authorizing provider's In Basket if patient is active on MyChart::   Yes    CBC Auto Differential     Standing Status:   Future     Number of Occurrences:   1     Expected Date:   3/19/2025     Expiration Date:   2026     Send normal result to authorizing  provider's In Basket if patient is active on MyChart::   Yes       Questions answered to desired level of satisfaction  Verbalized understanding to all information and instructions provided.  Follow up in about 1 week (around 3/26/2025) for ROB. Alyse Boswell, DO [facility]

## 2025-03-26 ENCOUNTER — ROUTINE PRENATAL (OUTPATIENT)
Dept: OBSTETRICS AND GYNECOLOGY | Facility: CLINIC | Age: 35
End: 2025-03-26
Payer: COMMERCIAL

## 2025-03-26 VITALS
DIASTOLIC BLOOD PRESSURE: 69 MMHG | BODY MASS INDEX: 26.7 KG/M2 | WEIGHT: 146 LBS | HEART RATE: 82 BPM | SYSTOLIC BLOOD PRESSURE: 105 MMHG

## 2025-03-26 DIAGNOSIS — Z34.83 ENCOUNTER FOR SUPERVISION OF OTHER NORMAL PREGNANCY, THIRD TRIMESTER: Primary | ICD-10-CM

## 2025-03-26 DIAGNOSIS — Z3A.37 37 WEEKS GESTATION OF PREGNANCY: ICD-10-CM

## 2025-03-26 DIAGNOSIS — Z87.59 HISTORY OF MISCARRIAGE: ICD-10-CM

## 2025-03-26 DIAGNOSIS — R82.998 LEUKOCYTES IN URINE: ICD-10-CM

## 2025-03-26 DIAGNOSIS — R35.0 FREQUENCY OF URINATION: ICD-10-CM

## 2025-03-26 PROCEDURE — 0502F SUBSEQUENT PRENATAL CARE: CPT | Mod: ,,, | Performed by: STUDENT IN AN ORGANIZED HEALTH CARE EDUCATION/TRAINING PROGRAM

## 2025-03-26 PROCEDURE — 87086 URINE CULTURE/COLONY COUNT: CPT | Mod: ,,, | Performed by: CLINICAL MEDICAL LABORATORY

## 2025-03-26 PROCEDURE — 99213 OFFICE O/P EST LOW 20 MIN: CPT | Mod: PBBFAC | Performed by: STUDENT IN AN ORGANIZED HEALTH CARE EDUCATION/TRAINING PROGRAM

## 2025-03-26 PROCEDURE — 99999 PR PBB SHADOW E&M-EST. PATIENT-LVL III: CPT | Mod: PBBFAC,,, | Performed by: STUDENT IN AN ORGANIZED HEALTH CARE EDUCATION/TRAINING PROGRAM

## 2025-03-28 LAB — UA COMPLETE W REFLEX CULTURE PNL UR: ABNORMAL

## 2025-03-28 NOTE — PROGRESS NOTES
Return OB Visit    34 y.o. female  at 37w2d   She c/o none.  Reports good fetal movement or fluttering. Denies any vaginal bleeding, leakage of fluid, cramping, contractions, or pressure.   Total weight gain/weight loss in pregnancy: Not found.     Vitals  BP: 105/69  Pulse: 82  Weight: 66.2 kg (146 lb)  Prenatal  Fetal Heart Rate: 120  Movement: Present    Prenatal Labs:  Lab Results   Component Value Date    HGB 10.9 (L) 2025    HCT 34.4 (L) 2025     2025    HEPBSAG Non-Reactive 2024    [id number] Non-Reactive 2024    LABNGO Negative 2025    LABURIN No Growth To Date 2025       A: 37w3d           ICD-10-CM ICD-9-CM    1. Encounter for supervision of other normal pregnancy, third trimester  Z34.83 V22.1       2. History of miscarriage  Z87.59 V13.29       3. 37 weeks gestation of pregnancy  Z3A.37 V22.2       4. Frequency of urination  R35.0 788.41 POCT URINALYSIS W/O SCOPE      5. Leukocytes in urine  R82.998 791.7 Urine Culture High Risk          P: Bleeding, daily fetal kick counts, and  labor/labor precautions discussed.    No pregnancy checklist tasks were completed during this visit, and no tasks are pending completion.      Orders Placed This Encounter   Procedures    Urine Culture High Risk           Send normal result to authorizing provider's In Basket if patient is active on MyChart::   Yes       Questions answered to desired level of satisfaction  Verbalized understanding to all information and instructions provided.  Follow up in about 1 week (around 2025) for ROB. Alyse Boswell, DO [facility]

## 2025-04-02 ENCOUNTER — ROUTINE PRENATAL (OUTPATIENT)
Dept: OBSTETRICS AND GYNECOLOGY | Facility: CLINIC | Age: 35
End: 2025-04-02
Payer: COMMERCIAL

## 2025-04-02 VITALS
HEART RATE: 77 BPM | BODY MASS INDEX: 26.89 KG/M2 | DIASTOLIC BLOOD PRESSURE: 81 MMHG | SYSTOLIC BLOOD PRESSURE: 113 MMHG | WEIGHT: 147 LBS

## 2025-04-02 DIAGNOSIS — Z3A.38 38 WEEKS GESTATION OF PREGNANCY: ICD-10-CM

## 2025-04-02 DIAGNOSIS — Z87.59 HISTORY OF MISCARRIAGE: ICD-10-CM

## 2025-04-02 DIAGNOSIS — Z34.83 ENCOUNTER FOR SUPERVISION OF OTHER NORMAL PREGNANCY, THIRD TRIMESTER: Primary | ICD-10-CM

## 2025-04-02 DIAGNOSIS — R82.998 LEUKOCYTES IN URINE: ICD-10-CM

## 2025-04-02 DIAGNOSIS — Z34.90 ENCOUNTER FOR ELECTIVE INDUCTION OF LABOR: ICD-10-CM

## 2025-04-02 DIAGNOSIS — R35.0 FREQUENCY OF URINATION: ICD-10-CM

## 2025-04-02 LAB
BILIRUB SERPL-MCNC: NORMAL MG/DL
BLOOD URINE, POC: NORMAL
CLARITY, UA: NORMAL
COLOR, UA: NORMAL
GLUCOSE UR QL STRIP: NORMAL
KETONES UR QL STRIP: NORMAL
LEUKOCYTE ESTERASE URINE, POC: NORMAL
NITRITE, POC UA: NORMAL
PH, POC UA: 6
PROTEIN, POC: NORMAL
SPECIFIC GRAVITY, POC UA: 1.01
UROBILINOGEN, POC UA: 0.2

## 2025-04-02 PROCEDURE — 87086 URINE CULTURE/COLONY COUNT: CPT | Mod: ,,, | Performed by: CLINICAL MEDICAL LABORATORY

## 2025-04-02 PROCEDURE — 0502F SUBSEQUENT PRENATAL CARE: CPT | Mod: ,,, | Performed by: STUDENT IN AN ORGANIZED HEALTH CARE EDUCATION/TRAINING PROGRAM

## 2025-04-02 PROCEDURE — 99999 PR PBB SHADOW E&M-EST. PATIENT-LVL III: CPT | Mod: PBBFAC,,, | Performed by: STUDENT IN AN ORGANIZED HEALTH CARE EDUCATION/TRAINING PROGRAM

## 2025-04-02 PROCEDURE — 99213 OFFICE O/P EST LOW 20 MIN: CPT | Mod: PBBFAC | Performed by: STUDENT IN AN ORGANIZED HEALTH CARE EDUCATION/TRAINING PROGRAM

## 2025-04-02 RX ORDER — MISOPROSTOL 200 UG/1
800 TABLET ORAL ONCE AS NEEDED
OUTPATIENT
Start: 2025-04-02

## 2025-04-02 RX ORDER — SIMETHICONE 80 MG
1 TABLET,CHEWABLE ORAL 4 TIMES DAILY PRN
OUTPATIENT
Start: 2025-04-02

## 2025-04-02 RX ORDER — OXYTOCIN/0.9 % SODIUM CHLORIDE 15/250 ML
95 PLASTIC BAG, INJECTION (ML) INTRAVENOUS ONCE AS NEEDED
OUTPATIENT
Start: 2025-04-02 | End: 2036-08-29

## 2025-04-02 RX ORDER — OXYTOCIN/0.9 % SODIUM CHLORIDE 15/250 ML
0-32 PLASTIC BAG, INJECTION (ML) INTRAVENOUS CONTINUOUS
OUTPATIENT
Start: 2025-04-02

## 2025-04-02 RX ORDER — SODIUM CHLORIDE, SODIUM LACTATE, POTASSIUM CHLORIDE, CALCIUM CHLORIDE 600; 310; 30; 20 MG/100ML; MG/100ML; MG/100ML; MG/100ML
INJECTION, SOLUTION INTRAVENOUS CONTINUOUS
OUTPATIENT
Start: 2025-04-02

## 2025-04-02 RX ORDER — DIPHENOXYLATE HYDROCHLORIDE AND ATROPINE SULFATE 2.5; .025 MG/1; MG/1
2 TABLET ORAL EVERY 6 HOURS PRN
OUTPATIENT
Start: 2025-04-02

## 2025-04-02 RX ORDER — MISOPROSTOL 200 UG/1
800 TABLET ORAL ONCE AS NEEDED
OUTPATIENT
Start: 2025-04-02 | End: 2036-08-29

## 2025-04-02 RX ORDER — ONDANSETRON 4 MG/1
8 TABLET, ORALLY DISINTEGRATING ORAL EVERY 8 HOURS PRN
OUTPATIENT
Start: 2025-04-02

## 2025-04-02 RX ORDER — METHYLERGONOVINE MALEATE 0.2 MG/ML
200 INJECTION INTRAVENOUS ONCE AS NEEDED
OUTPATIENT
Start: 2025-04-02 | End: 2036-08-29

## 2025-04-02 RX ORDER — OXYTOCIN 10 [USP'U]/ML
10 INJECTION, SOLUTION INTRAMUSCULAR; INTRAVENOUS ONCE AS NEEDED
OUTPATIENT
Start: 2025-04-02 | End: 2036-08-29

## 2025-04-02 RX ORDER — OXYTOCIN/0.9 % SODIUM CHLORIDE 15/250 ML
95 PLASTIC BAG, INJECTION (ML) INTRAVENOUS CONTINUOUS PRN
OUTPATIENT
Start: 2025-04-02

## 2025-04-02 RX ORDER — BUTORPHANOL TARTRATE 2 MG/ML
2 INJECTION INTRAMUSCULAR; INTRAVENOUS
OUTPATIENT
Start: 2025-04-02

## 2025-04-02 RX ORDER — LIDOCAINE HYDROCHLORIDE 10 MG/ML
10 INJECTION, SOLUTION INFILTRATION; PERINEURAL ONCE AS NEEDED
OUTPATIENT
Start: 2025-04-02 | End: 2036-08-29

## 2025-04-02 RX ORDER — OXYTOCIN-SODIUM CHLORIDE 0.9% IV SOLN 30 UNIT/500ML 30-0.9/5 UT/ML-%
20 SOLUTION INTRAVENOUS ONCE AS NEEDED
OUTPATIENT
Start: 2025-04-02 | End: 2036-08-29

## 2025-04-02 RX ORDER — CARBOPROST TROMETHAMINE 250 UG/ML
250 INJECTION, SOLUTION INTRAMUSCULAR
OUTPATIENT
Start: 2025-04-02

## 2025-04-02 RX ORDER — OXYTOCIN-SODIUM CHLORIDE 0.9% IV SOLN 30 UNIT/500ML 30-0.9/5 UT/ML-%
10 SOLUTION INTRAVENOUS ONCE AS NEEDED
OUTPATIENT
Start: 2025-04-02 | End: 2036-08-29

## 2025-04-02 RX ORDER — BUTORPHANOL TARTRATE 1 MG/ML
1 INJECTION INTRAMUSCULAR; INTRAVENOUS
OUTPATIENT
Start: 2025-04-02

## 2025-04-02 RX ORDER — TRANEXAMIC ACID 10 MG/ML
1000 INJECTION, SOLUTION INTRAVENOUS EVERY 30 MIN PRN
OUTPATIENT
Start: 2025-04-02

## 2025-04-02 RX ORDER — CALCIUM CARBONATE 200(500)MG
500 TABLET,CHEWABLE ORAL 3 TIMES DAILY PRN
OUTPATIENT
Start: 2025-04-02

## 2025-04-02 RX ORDER — SODIUM CHLORIDE 9 MG/ML
INJECTION, SOLUTION INTRAVENOUS
OUTPATIENT
Start: 2025-04-02

## 2025-04-03 NOTE — PROGRESS NOTES
Return OB Visit    34 y.o. female  at 38w2d   She c/o none. Requests IOL at 39 weeks.  Reports good fetal movement or fluttering. Denies any vaginal bleeding, leakage of fluid, cramping, contractions, or pressure.   Total weight gain/weight loss in pregnancy: Not found.     Vitals  BP: 113/81  Pulse: 77  Weight: 66.7 kg (147 lb)  Prenatal  Fetal Heart Rate: 140  Movement: Present  Cervical Exam  Dilation: 2.5  Effacement (%): 50  Station: -3  Station (Labor Curve): 8 cm  Dilation/Effacement/Station  Dilation: 2.5  Effacement (%): 50  Station: -3    Prenatal Labs:  Lab Results   Component Value Date    HGB 10.9 (L) 2025    HCT 34.4 (L) 2025     2025    HEPBSAG Non-Reactive 2024    [id number] Non-Reactive 2024    LABNGO Negative 2025    LABURIN 30,000 Yeast (A) 2025       A: 38w2d           ICD-10-CM ICD-9-CM    1. Encounter for supervision of other normal pregnancy, third trimester  Z34.83 V22.1       2. History of miscarriage  Z87.59 V13.29       3. 38 weeks gestation of pregnancy  Z3A.38 V22.2       4. Frequency of urination  R35.0 788.41 POCT URINALYSIS W/O SCOPE      5. Leukocytes in urine  R82.998 791.7 Urine Culture High Risk          P: Bleeding, daily fetal kick counts, and  labor/labor precautions discussed.    No pregnancy checklist tasks were completed during this visit, and no tasks are pending completion.      Orders Placed This Encounter   Procedures    Urine Culture High Risk           Send normal result to authorizing provider's In Basket if patient is active on MyChart::   Yes       Questions answered to desired level of satisfaction  Verbalized understanding to all information and instructions provided.  IOL scheduled 4 midnight    Alyse Boswell, DO [facility]

## 2025-04-04 LAB — UA COMPLETE W REFLEX CULTURE PNL UR: ABNORMAL

## 2025-04-06 ENCOUNTER — HOSPITAL ENCOUNTER (INPATIENT)
Facility: HOSPITAL | Age: 35
LOS: 2 days | Discharge: HOME | End: 2025-04-08
Attending: STUDENT IN AN ORGANIZED HEALTH CARE EDUCATION/TRAINING PROGRAM | Admitting: STUDENT IN AN ORGANIZED HEALTH CARE EDUCATION/TRAINING PROGRAM
Payer: COMMERCIAL

## 2025-04-06 DIAGNOSIS — R10.9 ABDOMINAL PAIN DURING PREGNANCY, THIRD TRIMESTER: ICD-10-CM

## 2025-04-06 DIAGNOSIS — Z34.90 ENCOUNTER FOR ELECTIVE INDUCTION OF LABOR: ICD-10-CM

## 2025-04-06 DIAGNOSIS — O26.893 ABDOMINAL PAIN DURING PREGNANCY, THIRD TRIMESTER: ICD-10-CM

## 2025-04-06 DIAGNOSIS — Z34.83 ENCOUNTER FOR SUPERVISION OF OTHER NORMAL PREGNANCY, THIRD TRIMESTER: ICD-10-CM

## 2025-04-06 DIAGNOSIS — Z3A.38 38 WEEKS GESTATION OF PREGNANCY: ICD-10-CM

## 2025-04-06 LAB
ALBUMIN SERPL BCP-MCNC: 3.2 G/DL (ref 3.5–5)
ALBUMIN/GLOB SERPL: 0.9 {RATIO}
ALP SERPL-CCNC: 190 U/L (ref 40–150)
ALT SERPL W P-5'-P-CCNC: 12 U/L
ANION GAP SERPL CALCULATED.3IONS-SCNC: 15 MMOL/L (ref 7–16)
AST SERPL W P-5'-P-CCNC: 24 U/L (ref 11–45)
BASOPHILS # BLD AUTO: 0.07 K/UL (ref 0–0.2)
BASOPHILS NFR BLD AUTO: 0.4 % (ref 0–1)
BILIRUB SERPL-MCNC: 0.4 MG/DL
BUN SERPL-MCNC: 8 MG/DL (ref 7–19)
BUN/CREAT SERPL: 13 (ref 6–20)
CALCIUM SERPL-MCNC: 9.7 MG/DL (ref 8.4–10.2)
CHLORIDE SERPL-SCNC: 109 MMOL/L (ref 98–107)
CO2 SERPL-SCNC: 18 MMOL/L (ref 22–29)
CREAT SERPL-MCNC: 0.63 MG/DL (ref 0.55–1.02)
CTP QC/QA: YES
DIFFERENTIAL METHOD BLD: ABNORMAL
EGFR (NO RACE VARIABLE) (RUSH/TITUS): 120 ML/MIN/1.73M2
EOSINOPHIL # BLD AUTO: 0.13 K/UL (ref 0–0.5)
EOSINOPHIL NFR BLD AUTO: 0.8 % (ref 1–4)
ERYTHROCYTE [DISTWIDTH] IN BLOOD BY AUTOMATED COUNT: 14.4 % (ref 11.5–14.5)
GLOBULIN SER-MCNC: 3.6 G/DL (ref 2–4)
GLUCOSE SERPL-MCNC: 82 MG/DL (ref 74–100)
HBV SURFACE AG SERPL QL IA: NORMAL
HCT VFR BLD AUTO: 35.6 % (ref 38–47)
HGB BLD-MCNC: 11.8 G/DL (ref 12–16)
HIV 1+O+2 AB SERPL QL: NORMAL
IMM GRANULOCYTES # BLD AUTO: 0.14 K/UL (ref 0–0.04)
IMM GRANULOCYTES NFR BLD: 0.9 % (ref 0–0.4)
LYMPHOCYTES # BLD AUTO: 2.4 K/UL (ref 1–4.8)
LYMPHOCYTES NFR BLD AUTO: 15.1 % (ref 27–41)
MCH RBC QN AUTO: 27 PG (ref 27–31)
MCHC RBC AUTO-ENTMCNC: 33.1 G/DL (ref 32–36)
MCV RBC AUTO: 81.5 FL (ref 80–96)
MONOCYTES # BLD AUTO: 0.88 K/UL (ref 0–0.8)
MONOCYTES NFR BLD AUTO: 5.6 % (ref 2–6)
MPC BLD CALC-MCNC: 11 FL (ref 9.4–12.4)
NEUTROPHILS # BLD AUTO: 12.23 K/UL (ref 1.8–7.7)
NEUTROPHILS NFR BLD AUTO: 77.2 % (ref 53–65)
NRBC # BLD AUTO: 0 X10E3/UL
NRBC, AUTO (.00): 0 %
PLATELET # BLD AUTO: 244 K/UL (ref 150–400)
POTASSIUM SERPL-SCNC: 3.5 MMOL/L (ref 3.5–5.1)
PROT SERPL-MCNC: 6.8 G/DL (ref 6.4–8.3)
RBC # BLD AUTO: 4.37 M/UL (ref 4.2–5.4)
ROMPLUS TEST: POSITIVE
SODIUM SERPL-SCNC: 138 MMOL/L (ref 136–145)
SYPHILIS AB INTERPRETATION: NORMAL
WBC # BLD AUTO: 15.85 K/UL (ref 4.5–11)

## 2025-04-06 PROCEDURE — 80053 COMPREHEN METABOLIC PANEL: CPT | Performed by: STUDENT IN AN ORGANIZED HEALTH CARE EDUCATION/TRAINING PROGRAM

## 2025-04-06 PROCEDURE — 25000003 PHARM REV CODE 250: Performed by: STUDENT IN AN ORGANIZED HEALTH CARE EDUCATION/TRAINING PROGRAM

## 2025-04-06 PROCEDURE — 63600175 PHARM REV CODE 636 W HCPCS: Performed by: STUDENT IN AN ORGANIZED HEALTH CARE EDUCATION/TRAINING PROGRAM

## 2025-04-06 PROCEDURE — 86780 TREPONEMA PALLIDUM: CPT | Performed by: STUDENT IN AN ORGANIZED HEALTH CARE EDUCATION/TRAINING PROGRAM

## 2025-04-06 PROCEDURE — 11000001 HC ACUTE MED/SURG PRIVATE ROOM

## 2025-04-06 PROCEDURE — 87340 HEPATITIS B SURFACE AG IA: CPT | Performed by: STUDENT IN AN ORGANIZED HEALTH CARE EDUCATION/TRAINING PROGRAM

## 2025-04-06 PROCEDURE — 86901 BLOOD TYPING SEROLOGIC RH(D): CPT | Performed by: STUDENT IN AN ORGANIZED HEALTH CARE EDUCATION/TRAINING PROGRAM

## 2025-04-06 PROCEDURE — 0KQM0ZZ REPAIR PERINEUM MUSCLE, OPEN APPROACH: ICD-10-PCS | Performed by: OBSTETRICS & GYNECOLOGY

## 2025-04-06 PROCEDURE — 84112 EVAL AMNIOTIC FLUID PROTEIN: CPT

## 2025-04-06 PROCEDURE — 87389 HIV-1 AG W/HIV-1&-2 AB AG IA: CPT | Performed by: STUDENT IN AN ORGANIZED HEALTH CARE EDUCATION/TRAINING PROGRAM

## 2025-04-06 PROCEDURE — 85025 COMPLETE CBC W/AUTO DIFF WBC: CPT | Performed by: STUDENT IN AN ORGANIZED HEALTH CARE EDUCATION/TRAINING PROGRAM

## 2025-04-06 PROCEDURE — 99285 EMERGENCY DEPT VISIT HI MDM: CPT

## 2025-04-06 PROCEDURE — 36415 COLL VENOUS BLD VENIPUNCTURE: CPT | Performed by: STUDENT IN AN ORGANIZED HEALTH CARE EDUCATION/TRAINING PROGRAM

## 2025-04-06 RX ORDER — ONDANSETRON 4 MG/1
8 TABLET, ORALLY DISINTEGRATING ORAL EVERY 8 HOURS PRN
Status: DISCONTINUED | OUTPATIENT
Start: 2025-04-06 | End: 2025-04-07

## 2025-04-06 RX ORDER — OXYTOCIN-SODIUM CHLORIDE 0.9% IV SOLN 30 UNIT/500ML 30-0.9/5 UT/ML-%
20 SOLUTION INTRAVENOUS ONCE AS NEEDED
Status: DISCONTINUED | OUTPATIENT
Start: 2025-04-06 | End: 2025-04-07

## 2025-04-06 RX ORDER — OXYTOCIN/0.9 % SODIUM CHLORIDE 15/250 ML
95 PLASTIC BAG, INJECTION (ML) INTRAVENOUS CONTINUOUS PRN
Status: DISCONTINUED | OUTPATIENT
Start: 2025-04-06 | End: 2025-04-07

## 2025-04-06 RX ORDER — OXYTOCIN 10 [USP'U]/ML
10 INJECTION, SOLUTION INTRAMUSCULAR; INTRAVENOUS ONCE AS NEEDED
Status: DISCONTINUED | OUTPATIENT
Start: 2025-04-06 | End: 2025-04-07

## 2025-04-06 RX ORDER — CARBOPROST TROMETHAMINE 250 UG/ML
250 INJECTION, SOLUTION INTRAMUSCULAR
Status: DISCONTINUED | OUTPATIENT
Start: 2025-04-06 | End: 2025-04-07

## 2025-04-06 RX ORDER — TRANEXAMIC ACID 10 MG/ML
1000 INJECTION, SOLUTION INTRAVENOUS EVERY 30 MIN PRN
Status: DISCONTINUED | OUTPATIENT
Start: 2025-04-06 | End: 2025-04-07

## 2025-04-06 RX ORDER — SIMETHICONE 80 MG
1 TABLET,CHEWABLE ORAL 4 TIMES DAILY PRN
Status: DISCONTINUED | OUTPATIENT
Start: 2025-04-06 | End: 2025-04-07

## 2025-04-06 RX ORDER — SODIUM CHLORIDE 9 MG/ML
INJECTION, SOLUTION INTRAVENOUS
Status: DISCONTINUED | OUTPATIENT
Start: 2025-04-06 | End: 2025-04-07

## 2025-04-06 RX ORDER — METHYLERGONOVINE MALEATE 0.2 MG/ML
200 INJECTION INTRAVENOUS ONCE AS NEEDED
Status: DISCONTINUED | OUTPATIENT
Start: 2025-04-06 | End: 2025-04-07

## 2025-04-06 RX ORDER — BUTORPHANOL TARTRATE 2 MG/ML
2 INJECTION INTRAMUSCULAR; INTRAVENOUS
Status: DISCONTINUED | OUTPATIENT
Start: 2025-04-06 | End: 2025-04-07

## 2025-04-06 RX ORDER — OXYTOCIN/0.9 % SODIUM CHLORIDE 15/250 ML
0-32 PLASTIC BAG, INJECTION (ML) INTRAVENOUS CONTINUOUS
Status: DISCONTINUED | OUTPATIENT
Start: 2025-04-06 | End: 2025-04-07

## 2025-04-06 RX ORDER — OXYTOCIN-SODIUM CHLORIDE 0.9% IV SOLN 30 UNIT/500ML 30-0.9/5 UT/ML-%
10 SOLUTION INTRAVENOUS ONCE AS NEEDED
Status: DISCONTINUED | OUTPATIENT
Start: 2025-04-06 | End: 2025-04-08 | Stop reason: HOSPADM

## 2025-04-06 RX ORDER — CALCIUM CARBONATE 200(500)MG
500 TABLET,CHEWABLE ORAL 3 TIMES DAILY PRN
Status: DISCONTINUED | OUTPATIENT
Start: 2025-04-06 | End: 2025-04-08 | Stop reason: HOSPADM

## 2025-04-06 RX ORDER — MISOPROSTOL 200 UG/1
800 TABLET ORAL ONCE AS NEEDED
Status: DISCONTINUED | OUTPATIENT
Start: 2025-04-06 | End: 2025-04-07

## 2025-04-06 RX ORDER — SODIUM CHLORIDE, SODIUM LACTATE, POTASSIUM CHLORIDE, CALCIUM CHLORIDE 600; 310; 30; 20 MG/100ML; MG/100ML; MG/100ML; MG/100ML
INJECTION, SOLUTION INTRAVENOUS CONTINUOUS
Status: DISCONTINUED | OUTPATIENT
Start: 2025-04-06 | End: 2025-04-08 | Stop reason: HOSPADM

## 2025-04-06 RX ORDER — BUTORPHANOL TARTRATE 2 MG/ML
1 INJECTION INTRAMUSCULAR; INTRAVENOUS
Status: DISCONTINUED | OUTPATIENT
Start: 2025-04-06 | End: 2025-04-07

## 2025-04-06 RX ORDER — LIDOCAINE HYDROCHLORIDE 10 MG/ML
10 INJECTION, SOLUTION INFILTRATION; PERINEURAL ONCE AS NEEDED
Status: COMPLETED | OUTPATIENT
Start: 2025-04-06 | End: 2025-04-06

## 2025-04-06 RX ORDER — DIPHENOXYLATE HYDROCHLORIDE AND ATROPINE SULFATE 2.5; .025 MG/1; MG/1
2 TABLET ORAL EVERY 6 HOURS PRN
Status: DISCONTINUED | OUTPATIENT
Start: 2025-04-06 | End: 2025-04-07

## 2025-04-06 RX ORDER — OXYTOCIN/0.9 % SODIUM CHLORIDE 15/250 ML
95 PLASTIC BAG, INJECTION (ML) INTRAVENOUS ONCE AS NEEDED
Status: DISCONTINUED | OUTPATIENT
Start: 2025-04-06 | End: 2025-04-07

## 2025-04-06 RX ADMIN — SODIUM CHLORIDE, POTASSIUM CHLORIDE, SODIUM LACTATE AND CALCIUM CHLORIDE: 600; 310; 30; 20 INJECTION, SOLUTION INTRAVENOUS at 10:04

## 2025-04-06 RX ADMIN — LIDOCAINE HYDROCHLORIDE 10 ML: 10 INJECTION, SOLUTION INFILTRATION; PERINEURAL at 11:04

## 2025-04-06 RX ADMIN — OXYTOCIN 95 MILLI-UNITS/MIN: 10 INJECTION INTRAVENOUS at 11:04

## 2025-04-06 RX ADMIN — AMPICILLIN SODIUM 2 G: 2 INJECTION, POWDER, FOR SOLUTION INTRAMUSCULAR; INTRAVENOUS at 11:04

## 2025-04-06 NOTE — Clinical Note
I certify that Inpatient services for greater than or equal to 2 midnights are medically necessary:: Yes   Transfer To (Destination): [facility] LABOR AND DELIVERY [[id number]]   Diagnosis: Labor abnormality, antepartum [544404]   Future Attending Provider: FABIOLA JACKSON [[id number]]   Requested Bed Type: Standard [1]   Reason for IP Medical Treatment  (Clinical interventions that can only be accomplished in the IP setting? ) :: labor   Estimated Length of Stay:: 2 midnights   Plans for Post-Acute care--if anticipated (pick the single best option):: A. No post acute care anticipated at this time   Special Needs:: No Special Needs [1]

## 2025-04-07 LAB
ABORH RETYPE: NORMAL
BASOPHILS # BLD AUTO: 0.03 K/UL (ref 0–0.2)
BASOPHILS # BLD AUTO: 0.09 K/UL (ref 0–0.2)
BASOPHILS NFR BLD AUTO: 0.1 % (ref 0–1)
BASOPHILS NFR BLD AUTO: 0.3 % (ref 0–1)
DIFFERENTIAL METHOD BLD: ABNORMAL
DIFFERENTIAL METHOD BLD: ABNORMAL
EOSINOPHIL # BLD AUTO: 0.01 K/UL (ref 0–0.5)
EOSINOPHIL # BLD AUTO: 0.07 K/UL (ref 0–0.5)
EOSINOPHIL NFR BLD AUTO: 0 % (ref 1–4)
EOSINOPHIL NFR BLD AUTO: 0.3 % (ref 1–4)
ERYTHROCYTE [DISTWIDTH] IN BLOOD BY AUTOMATED COUNT: 14.5 % (ref 11.5–14.5)
ERYTHROCYTE [DISTWIDTH] IN BLOOD BY AUTOMATED COUNT: 14.5 % (ref 11.5–14.5)
HCT VFR BLD AUTO: 33.8 % (ref 38–47)
HCT VFR BLD AUTO: 38.2 % (ref 38–47)
HGB BLD-MCNC: 10.8 G/DL (ref 12–16)
HGB BLD-MCNC: 11.9 G/DL (ref 12–16)
IMM GRANULOCYTES # BLD AUTO: 0.12 K/UL (ref 0–0.04)
IMM GRANULOCYTES # BLD AUTO: 0.29 K/UL (ref 0–0.04)
IMM GRANULOCYTES NFR BLD: 0.6 % (ref 0–0.4)
IMM GRANULOCYTES NFR BLD: 0.9 % (ref 0–0.4)
INDIRECT COOMBS: NORMAL
LYMPHOCYTES # BLD AUTO: 1.73 K/UL (ref 1–4.8)
LYMPHOCYTES # BLD AUTO: 1.8 K/UL (ref 1–4.8)
LYMPHOCYTES NFR BLD AUTO: 5.7 % (ref 27–41)
LYMPHOCYTES NFR BLD AUTO: 9 % (ref 27–41)
LYMPHOCYTES NFR BLD MANUAL: 4 % (ref 27–41)
MCH RBC QN AUTO: 26.5 PG (ref 27–31)
MCH RBC QN AUTO: 26.6 PG (ref 27–31)
MCHC RBC AUTO-ENTMCNC: 31.2 G/DL (ref 32–36)
MCHC RBC AUTO-ENTMCNC: 32 G/DL (ref 32–36)
MCV RBC AUTO: 83.3 FL (ref 80–96)
MCV RBC AUTO: 85.1 FL (ref 80–96)
MONOCYTES # BLD AUTO: 0.9 K/UL (ref 0–0.8)
MONOCYTES # BLD AUTO: 1.24 K/UL (ref 0–0.8)
MONOCYTES NFR BLD AUTO: 4.1 % (ref 2–6)
MONOCYTES NFR BLD AUTO: 4.5 % (ref 2–6)
MONOCYTES NFR BLD MANUAL: 5 % (ref 2–6)
MPC BLD CALC-MCNC: 11.1 FL (ref 9.4–12.4)
MPC BLD CALC-MCNC: 11.3 FL (ref 9.4–12.4)
NEUTROPHILS # BLD AUTO: 17.16 K/UL (ref 1.8–7.7)
NEUTROPHILS # BLD AUTO: 27.22 K/UL (ref 1.8–7.7)
NEUTROPHILS NFR BLD AUTO: 85.5 % (ref 53–65)
NEUTROPHILS NFR BLD AUTO: 89 % (ref 53–65)
NEUTS SEG NFR BLD MANUAL: 91 % (ref 50–62)
NRBC # BLD AUTO: 0 X10E3/UL
NRBC # BLD AUTO: 0 X10E3/UL
NRBC, AUTO (.00): 0 %
NRBC, AUTO (.00): 0 %
PLATELET # BLD AUTO: 240 K/UL (ref 150–400)
PLATELET # BLD AUTO: 266 K/UL (ref 150–400)
PLATELET MORPHOLOGY: NORMAL
RBC # BLD AUTO: 4.06 M/UL (ref 4.2–5.4)
RBC # BLD AUTO: 4.49 M/UL (ref 4.2–5.4)
RBC MORPH BLD: NORMAL
RH BLD: NORMAL
SPECIMEN OUTDATE: NORMAL
WBC # BLD AUTO: 20.08 K/UL (ref 4.5–11)
WBC # BLD AUTO: 30.58 K/UL (ref 4.5–11)

## 2025-04-07 PROCEDURE — 36415 COLL VENOUS BLD VENIPUNCTURE: CPT | Performed by: STUDENT IN AN ORGANIZED HEALTH CARE EDUCATION/TRAINING PROGRAM

## 2025-04-07 PROCEDURE — 85025 COMPLETE CBC W/AUTO DIFF WBC: CPT | Performed by: STUDENT IN AN ORGANIZED HEALTH CARE EDUCATION/TRAINING PROGRAM

## 2025-04-07 PROCEDURE — 11000001 HC ACUTE MED/SURG PRIVATE ROOM

## 2025-04-07 PROCEDURE — 85025 COMPLETE CBC W/AUTO DIFF WBC: CPT | Performed by: OBSTETRICS & GYNECOLOGY

## 2025-04-07 PROCEDURE — 72200004 HC VAGINAL DELIVERY LEVEL I

## 2025-04-07 PROCEDURE — 72100002 HC LABOR CARE, 1ST 8 HOURS

## 2025-04-07 PROCEDURE — 27000980 HC MATTRESS, OVERLAY WAFFLE

## 2025-04-07 PROCEDURE — 36415 COLL VENOUS BLD VENIPUNCTURE: CPT | Performed by: OBSTETRICS & GYNECOLOGY

## 2025-04-07 PROCEDURE — 25000003 PHARM REV CODE 250: Performed by: OBSTETRICS & GYNECOLOGY

## 2025-04-07 RX ORDER — ONDANSETRON 4 MG/1
8 TABLET, ORALLY DISINTEGRATING ORAL EVERY 8 HOURS PRN
Status: DISCONTINUED | OUTPATIENT
Start: 2025-04-07 | End: 2025-04-08 | Stop reason: HOSPADM

## 2025-04-07 RX ORDER — DOCUSATE SODIUM 100 MG/1
200 CAPSULE, LIQUID FILLED ORAL 2 TIMES DAILY PRN
Status: DISCONTINUED | OUTPATIENT
Start: 2025-04-07 | End: 2025-04-08 | Stop reason: HOSPADM

## 2025-04-07 RX ORDER — DIPHENOXYLATE HYDROCHLORIDE AND ATROPINE SULFATE 2.5; .025 MG/1; MG/1
2 TABLET ORAL EVERY 6 HOURS PRN
Status: DISCONTINUED | OUTPATIENT
Start: 2025-04-07 | End: 2025-04-08 | Stop reason: HOSPADM

## 2025-04-07 RX ORDER — OXYTOCIN 10 [USP'U]/ML
10 INJECTION, SOLUTION INTRAMUSCULAR; INTRAVENOUS ONCE AS NEEDED
Status: DISCONTINUED | OUTPATIENT
Start: 2025-04-07 | End: 2025-04-08 | Stop reason: HOSPADM

## 2025-04-07 RX ORDER — OXYTOCIN/0.9 % SODIUM CHLORIDE 15/250 ML
95 PLASTIC BAG, INJECTION (ML) INTRAVENOUS CONTINUOUS PRN
Status: DISCONTINUED | OUTPATIENT
Start: 2025-04-07 | End: 2025-04-08 | Stop reason: HOSPADM

## 2025-04-07 RX ORDER — OXYTOCIN/0.9 % SODIUM CHLORIDE 15/250 ML
95 PLASTIC BAG, INJECTION (ML) INTRAVENOUS ONCE AS NEEDED
Status: DISCONTINUED | OUTPATIENT
Start: 2025-04-07 | End: 2025-04-08 | Stop reason: HOSPADM

## 2025-04-07 RX ORDER — MISOPROSTOL 200 UG/1
800 TABLET ORAL ONCE AS NEEDED
Status: DISCONTINUED | OUTPATIENT
Start: 2025-04-07 | End: 2025-04-08 | Stop reason: HOSPADM

## 2025-04-07 RX ORDER — OXYCODONE AND ACETAMINOPHEN 10; 325 MG/1; MG/1
1 TABLET ORAL EVERY 4 HOURS PRN
Status: DISCONTINUED | OUTPATIENT
Start: 2025-04-07 | End: 2025-04-08 | Stop reason: HOSPADM

## 2025-04-07 RX ORDER — IBUPROFEN 800 MG/1
800 TABLET ORAL EVERY 8 HOURS PRN
Status: DISCONTINUED | OUTPATIENT
Start: 2025-04-07 | End: 2025-04-08 | Stop reason: HOSPADM

## 2025-04-07 RX ORDER — CARBOPROST TROMETHAMINE 250 UG/ML
250 INJECTION, SOLUTION INTRAMUSCULAR
Status: DISCONTINUED | OUTPATIENT
Start: 2025-04-07 | End: 2025-04-08 | Stop reason: HOSPADM

## 2025-04-07 RX ORDER — OXYCODONE AND ACETAMINOPHEN 5; 325 MG/1; MG/1
1 TABLET ORAL EVERY 4 HOURS PRN
Status: DISCONTINUED | OUTPATIENT
Start: 2025-04-07 | End: 2025-04-08 | Stop reason: HOSPADM

## 2025-04-07 RX ORDER — HYDROCORTISONE 25 MG/G
CREAM TOPICAL 3 TIMES DAILY PRN
Status: DISCONTINUED | OUTPATIENT
Start: 2025-04-07 | End: 2025-04-08 | Stop reason: HOSPADM

## 2025-04-07 RX ORDER — DIPHENHYDRAMINE HYDROCHLORIDE 50 MG/ML
25 INJECTION, SOLUTION INTRAMUSCULAR; INTRAVENOUS EVERY 4 HOURS PRN
Status: DISCONTINUED | OUTPATIENT
Start: 2025-04-07 | End: 2025-04-08 | Stop reason: HOSPADM

## 2025-04-07 RX ORDER — DIPHENHYDRAMINE HCL 25 MG
25 CAPSULE ORAL EVERY 4 HOURS PRN
Status: DISCONTINUED | OUTPATIENT
Start: 2025-04-07 | End: 2025-04-08 | Stop reason: HOSPADM

## 2025-04-07 RX ORDER — SODIUM CHLORIDE 0.9 % (FLUSH) 0.9 %
10 SYRINGE (ML) INJECTION
Status: DISCONTINUED | OUTPATIENT
Start: 2025-04-07 | End: 2025-04-08 | Stop reason: HOSPADM

## 2025-04-07 RX ORDER — SIMETHICONE 80 MG
1 TABLET,CHEWABLE ORAL EVERY 6 HOURS PRN
Status: DISCONTINUED | OUTPATIENT
Start: 2025-04-07 | End: 2025-04-08 | Stop reason: HOSPADM

## 2025-04-07 RX ORDER — ACETAMINOPHEN 325 MG/1
650 TABLET ORAL EVERY 6 HOURS SCHEDULED
Status: DISCONTINUED | OUTPATIENT
Start: 2025-04-07 | End: 2025-04-08

## 2025-04-07 RX ORDER — METHYLERGONOVINE MALEATE 0.2 MG/ML
200 INJECTION INTRAVENOUS ONCE AS NEEDED
Status: DISCONTINUED | OUTPATIENT
Start: 2025-04-07 | End: 2025-04-08 | Stop reason: HOSPADM

## 2025-04-07 RX ORDER — OXYTOCIN-SODIUM CHLORIDE 0.9% IV SOLN 30 UNIT/500ML 30-0.9/5 UT/ML-%
20 SOLUTION INTRAVENOUS ONCE AS NEEDED
Status: DISCONTINUED | OUTPATIENT
Start: 2025-04-07 | End: 2025-04-08 | Stop reason: HOSPADM

## 2025-04-07 RX ORDER — TRANEXAMIC ACID 10 MG/ML
1000 INJECTION, SOLUTION INTRAVENOUS EVERY 30 MIN PRN
Status: DISCONTINUED | OUTPATIENT
Start: 2025-04-07 | End: 2025-04-08 | Stop reason: HOSPADM

## 2025-04-07 RX ORDER — PRENATAL WITH FERROUS FUM AND FOLIC ACID 3080; 920; 120; 400; 22; 1.84; 3; 20; 10; 1; 12; 200; 27; 25; 2 [IU]/1; [IU]/1; MG/1; [IU]/1; MG/1; MG/1; MG/1; MG/1; MG/1; MG/1; UG/1; MG/1; MG/1; MG/1; MG/1
1 TABLET ORAL DAILY
Status: DISCONTINUED | OUTPATIENT
Start: 2025-04-07 | End: 2025-04-08 | Stop reason: HOSPADM

## 2025-04-07 RX ADMIN — IBUPROFEN 800 MG: 800 TABLET, FILM COATED ORAL at 07:04

## 2025-04-07 RX ADMIN — Medication 1 TABLET: at 10:04

## 2025-04-07 RX ADMIN — HYDROCORTISONE 2.5%: 25 CREAM TOPICAL at 06:04

## 2025-04-07 RX ADMIN — IBUPROFEN 800 MG: 800 TABLET, FILM COATED ORAL at 05:04

## 2025-04-07 NOTE — PLAN OF CARE
Problem: Postpartum (Vaginal Delivery)  Goal: Successful Parent Role Transition  Outcome: Progressing  Goal: Hemostasis  Outcome: Progressing  Goal: Absence of Infection Signs and Symptoms  Outcome: Progressing  Goal: Anesthesia/Sedation Recovery  Outcome: Progressing  Goal: Optimal Pain Control and Function  Outcome: Progressing  Goal: Effective Urinary Elimination  Outcome: Progressing     Problem: Adult Inpatient Plan of Care  Goal: Plan of Care Review  Outcome: Progressing  Goal: Patient-Specific Goal (Individualized)  Outcome: Progressing  Goal: Absence of Hospital-Acquired Illness or Injury  Outcome: Progressing  Goal: Optimal Comfort and Wellbeing  Outcome: Progressing  Goal: Readiness for Transition of Care  Outcome: Progressing     Problem: Breastfeeding  Goal: Effective Breastfeeding  Outcome: Progressing

## 2025-04-07 NOTE — H&P
Expand All Collapse All    Encounter Date: 4/6/2025        History          Chief Complaint   Patient presents with    Rupture of Membranes      Patient is a 34-year-old G7 P 3-0 3 3 at 38 and 6/7 weeks gestational age who presents with regular painful contractions and leaking of amniotic fluid since earlier this afternoon; +fetal movement, no vaginal bleeding, +ruptured membranes, +regular painful contractions     Vital signs are stable and the patient is afebrile  Fetal heart tones in the 120s and reactive/category 1  Tocometer shows contractions every 2-4 minutes  Cervix-7/90/0                Review of patient's allergies indicates:   Allergen Reactions    Sulfa (sulfonamide antibiotics)             Past Medical History:   Diagnosis Date    Anemia              Past Surgical History:   Procedure Laterality Date    DILATION AND CURETTAGE OF UTERUS   2020     x2    REDUCTION OF BOTH BREASTS        REPAIR, LIGAMENT, KNEE, COLLATERAL, ACL, OR PCL                 Family History   Problem Relation Name Age of Onset    Cancer Paternal Grandfather        Cancer Maternal Grandfather        Epilepsy Mother          [Social History]    [Social History]        Tobacco Use    Smoking status: Never       Passive exposure: Never    Smokeless tobacco: Never   Substance Use Topics    Alcohol use: Never    Drug use: Never     Review of Systems   Constitutional:  Positive for fatigue.   HENT:  Positive for congestion.    Eyes: Negative.    Respiratory: Negative.     Cardiovascular: Negative.  Negative for leg swelling.   Gastrointestinal:  Positive for abdominal pain. Negative for nausea and vomiting.   Endocrine: Negative.    Genitourinary:  Positive for frequency. Negative for pelvic pain and vaginal bleeding.   Musculoskeletal:  Negative for back pain.   Skin: Negative.    Neurological:  Negative for weakness and headaches.   Hematological: Negative.    Psychiatric/Behavioral: Negative.           Physical Exam              Initial Vitals [04/06/25 2228]   BP Pulse Resp Temp SpO2   125/84 84 18 97.9 °F (36.6 °C) 99 %       MAP           --              Physical Exam     Vitals reviewed.  Constitutional: She appears well-developed and well-nourished.   HENT:   Head: Normocephalic and atraumatic.   Eyes: EOM are normal. Pupils are equal, round, and reactive to light.   Neck: Neck supple.   Normal range of motion.  Cardiovascular:  Normal rate and regular rhythm.           Pulmonary/Chest: Breath sounds normal.   Abdominal: Abdomen is soft.   Gravid uterus with no fundal tenderness   Genitourinary:    Vagina normal.      Genitourinary Comments: Cervix-7/90/0; +leaking of clear fluid      Musculoskeletal:         General: Normal range of motion.      Cervical back: Normal range of motion and neck supple.      Neurological: She is alert and oriented to person, place, and time. She has normal strength.   Skin: Skin is warm and dry.   Psychiatric: She has a normal mood and affect. Her behavior is normal.            ED Course   Procedures        Labs Reviewed   POCT ROM PLUS - Abnormal       Result Value      ROMPlus Test Positive (*)        Acceptable Yes               Imaging Results    None            Medications - No data to display  Medical Decision Making            Plan                         Clinical Impression:  Assessment and plan)  1-intrauterine pregnancy at 38 and 6/7 weeks gestational age  2-active labor-will admit for delivery        Final diagnoses:  [O62.9] Labor abnormality, antepartum  [Z3A.38] 38 weeks gestation of pregnancy (Primary)  [O26.893, R10.9] Abdominal pain during pregnancy, third trimester          ED Disposition Condition     Send to L&D Alvaro Armstrong MD  04/06/25 1020

## 2025-04-07 NOTE — PROGRESS NOTES
[facility] -  Labor and Delivery  Obstetrics  Postpartum Progress Note    Patient Name: Clemencia Bedolla  MRN: [id number]  Admission Date: 2025  Hospital Length of Stay: 1 days  Attending Physician: Bruce Hernandez DO  Primary Care Provider: Kraig Mclaughlin NP    Subjective:     Principal Problem:<principal problem not specified>    Hospital Course:  No notes on file    Interval History: PPD1 s/p     She is doing well this morning. She is tolerating a regular diet without nausea or vomiting. She is voiding spontaneously. She is ambulating. She has passed flatus, and has not a BM. Vaginal bleeding is mild. She denies fever or chills. Abdominal pain is mild and controlled with oral medications. She Is breastfeeding. She desires circumcision for her male baby: yes.    Objective:     Vital Signs (Most Recent):  Temp: 98.4 °F (36.9 °C) (25 1243)  Pulse: 83 (25 1243)  Resp: 18 (25 1243)  BP: 118/73 (25 1243)  SpO2: 99 % (25 0600) Vital Signs (24h Range):  Temp:  [97.2 °F (36.2 °C)-98.5 °F (36.9 °C)] 98.4 °F (36.9 °C)  Pulse:  [] 83  Resp:  [18-21] 18  SpO2:  [99 %] 99 %  BP: ()/(57-95) 118/73     Weight: 67.2 kg (148 lb 3.2 oz)  Body mass index is 27.11 kg/m².      Intake/Output Summary (Last 24 hours) at 2025 1305  Last data filed at 2025 0230  Gross per 24 hour   Intake --   Output 650 ml   Net -650 ml         Significant Labs:  Lab Results   Component Value Date    GROUPTRH O POS 2025    HEPBSAG Non-Reactive 2025     Recent Labs   Lab 25  1153   HGB 10.8*   HCT 33.8*       CBC:   Recent Labs   Lab 25  1153   WBC 20.08*   RBC 4.06*   HGB 10.8*   HCT 33.8*      MCV 83.3   MCH 26.6*   MCHC 32.0     I have personallly reviewed all pertinent lab results from the last 24 hours.  Recent Lab Results         25  1153   25  0211   25  2254   25  2239        Albumin/Globulin Ratio     0.9         ABO  and RH   O POS           Albumin     3.2         ALP     190         ALT     12         Anion Gap     15         AST     24         Baso # 0.03   0.09   0.07         Basophil % 0.1   0.3   0.4         BILIRUBIN TOTAL     0.4         BUN     8         BUN/CREAT RATIO     13         Calcium     9.7         Chloride     109         CO2     18         Creatinine     0.63         Differential Method Auto   Auto   Auto         eGFR     120  Comment: Estimated GFR calculated using the CKD-EPI creatinine (2021) equation.         Eos # 0.07   0.01   0.13         Eos % 0.3   0.0   0.8         Globulin, Total     3.6         Glucose     82         Group & Rh     O POS         Hematocrit 33.8   38.2   35.6         Hemoglobin 10.8   11.9   11.8         Hepatitis B Surface Ag     Non-Reactive         HIV 1/2 Ag/Ab     Non-Reactive         Immature Grans (Abs) 0.12   0.29   0.14         Immature Granulocytes 0.6   0.9   0.9         INDIRECT ЕКАТЕРИНА     NEG         Lymph # 1.80   1.73   2.40         Lymph % 9.0   5.7   15.1            4           MCH 26.6   26.5   27.0         MCHC 32.0   31.2   33.1         MCV 83.3   85.1   81.5         Mono # 0.90   1.24   0.88         Mono % 4.5   4.1   5.6            5           MPV 11.3   11.1   11.0         Neutrophils, Abs 17.16   27.22   12.23         Neutrophils Relative 85.5   89.0   77.2         nRBC 0.0   0.0   0.0         NUCLEATED RBC ABSOLUTE 0.00   0.00   0.00         PLATELET MORPHOLOGY   Normal           Platelet Count 240   266   244         Potassium     3.5         PROTEIN TOTAL     6.8          Acceptable       Yes       RBC 4.06   4.49   4.37         RBC Morphology   Normal           RDW 14.5   14.5   14.4         ROMPlus Test       Positive       Segmented Neutrophils, Man %   91           Sodium     138         Specimen Outdate     04/09/2025 23:59         Syphilis Ab Interpretation     Non-Reactive  Comment: 0.0 - 0.9: Non-Reactive  0.91 - 1.10: Equivocal  with RPR to follow  >1.10:  Reactive with RPR to Follow         WBC 20.08   30.58   15.85                 Physical Exam:   Constitutional: She is oriented to person, place, and time. She appears well-developed and well-nourished.    HENT:   Head: Normocephalic and atraumatic.    Eyes: Pupils are equal, round, and reactive to light.     Cardiovascular:  Normal rate.      Exam reveals no clubbing, no cyanosis and no edema.        Pulmonary/Chest: Effort normal.        Abdominal: Soft.     Genitourinary:    Uterus normal.             Musculoskeletal: Normal range of motion and moves all extremeties. No edema.       Neurological: She is alert and oriented to person, place, and time.    Skin: Skin is warm and dry. No cyanosis. Nails show no clubbing.    Psychiatric: She has a normal mood and affect. Her behavior is normal. Judgment and thought content normal.       Review of Systems   All other systems reviewed and are negative.    Assessment/Plan:     34 y.o. female  for:    Postpartum care following vaginal delivery  Meeting all milestones  Hgb stable  Consult lactation services prn        Disposition: As patient meets milestones, will plan to discharge PPD2.    Bruce Juan, DO  Obstetrics  [facility] -  Labor and Delivery

## 2025-04-07 NOTE — ED NOTES
Received pt to room with c/o SROM at 2100. Reports clear fluid. Gross ROM noted. Verbal consent given for SVE.

## 2025-04-07 NOTE — SUBJECTIVE & OBJECTIVE
Interval History: PPD1 s/p     She is doing well this morning. She is tolerating a regular diet without nausea or vomiting. She is voiding spontaneously. She is ambulating. She has passed flatus, and has not a BM. Vaginal bleeding is mild. She denies fever or chills. Abdominal pain is mild and controlled with oral medications. She Is breastfeeding. She desires circumcision for her male baby: yes.    Objective:     Vital Signs (Most Recent):  Temp: 98.4 °F (36.9 °C) (25 1243)  Pulse: 83 (25 1243)  Resp: 18 (25 1243)  BP: 118/73 (25 1243)  SpO2: 99 % (25 0600) Vital Signs (24h Range):  Temp:  [97.2 °F (36.2 °C)-98.5 °F (36.9 °C)] 98.4 °F (36.9 °C)  Pulse:  [] 83  Resp:  [18-21] 18  SpO2:  [99 %] 99 %  BP: ()/(57-95) 118/73     Weight: 67.2 kg (148 lb 3.2 oz)  Body mass index is 27.11 kg/m².      Intake/Output Summary (Last 24 hours) at 2025 1305  Last data filed at 2025 0230  Gross per 24 hour   Intake --   Output 650 ml   Net -650 ml         Significant Labs:  Lab Results   Component Value Date    GROUPTRH O POS 2025    HEPBSAG Non-Reactive 2025     Recent Labs   Lab 25  1153   HGB 10.8*   HCT 33.8*       CBC:   Recent Labs   Lab 25  1153   WBC 20.08*   RBC 4.06*   HGB 10.8*   HCT 33.8*      MCV 83.3   MCH 26.6*   MCHC 32.0     I have personallly reviewed all pertinent lab results from the last 24 hours.  Recent Lab Results         25  1153   25  0211   25  2254   25  2239        Albumin/Globulin Ratio     0.9         ABO and RH   O POS           Albumin     3.2         ALP     190         ALT     12         Anion Gap     15         AST     24         Baso # 0.03   0.09   0.07         Basophil % 0.1   0.3   0.4         BILIRUBIN TOTAL     0.4         BUN     8         BUN/CREAT RATIO     13         Calcium     9.7         Chloride     109         CO2     18         Creatinine     0.63         Differential  Method Auto   Auto   Auto         eGFR     120  Comment: Estimated GFR calculated using the CKD-EPI creatinine (2021) equation.         Eos # 0.07   0.01   0.13         Eos % 0.3   0.0   0.8         Globulin, Total     3.6         Glucose     82         Group & Rh     O POS         Hematocrit 33.8   38.2   35.6         Hemoglobin 10.8   11.9   11.8         Hepatitis B Surface Ag     Non-Reactive         HIV 1/2 Ag/Ab     Non-Reactive         Immature Grans (Abs) 0.12   0.29   0.14         Immature Granulocytes 0.6   0.9   0.9         INDIRECT ЕКАТЕРИНА     NEG         Lymph # 1.80   1.73   2.40         Lymph % 9.0   5.7   15.1            4           MCH 26.6   26.5   27.0         MCHC 32.0   31.2   33.1         MCV 83.3   85.1   81.5         Mono # 0.90   1.24   0.88         Mono % 4.5   4.1   5.6            5           MPV 11.3   11.1   11.0         Neutrophils, Abs 17.16   27.22   12.23         Neutrophils Relative 85.5   89.0   77.2         nRBC 0.0   0.0   0.0         NUCLEATED RBC ABSOLUTE 0.00   0.00   0.00         PLATELET MORPHOLOGY   Normal           Platelet Count 240   266   244         Potassium     3.5         PROTEIN TOTAL     6.8          Acceptable       Yes       RBC 4.06   4.49   4.37         RBC Morphology   Normal           RDW 14.5   14.5   14.4         ROMPlus Test       Positive       Segmented Neutrophils, Man %   91           Sodium     138         Specimen Outdate     04/09/2025 23:59         Syphilis Ab Interpretation     Non-Reactive  Comment: 0.0 - 0.9: Non-Reactive  0.91 - 1.10: Equivocal with RPR to follow  >1.10:  Reactive with RPR to Follow         WBC 20.08   30.58   15.85                 Physical Exam:   Constitutional: She is oriented to person, place, and time. She appears well-developed and well-nourished.    HENT:   Head: Normocephalic and atraumatic.    Eyes: Pupils are equal, round, and reactive to light.     Cardiovascular:  Normal rate.      Exam reveals no  clubbing, no cyanosis and no edema.        Pulmonary/Chest: Effort normal.        Abdominal: Soft.     Genitourinary:    Uterus normal.             Musculoskeletal: Normal range of motion and moves all extremeties. No edema.       Neurological: She is alert and oriented to person, place, and time.    Skin: Skin is warm and dry. No cyanosis. Nails show no clubbing.    Psychiatric: She has a normal mood and affect. Her behavior is normal. Judgment and thought content normal.       Review of Systems   All other systems reviewed and are negative.

## 2025-04-07 NOTE — L&D DELIVERY NOTE
Status post  of viable male infant with Apgars of 8 and 9; vertex; occiput anterior; body cord over left shoulder reduced at perineum; estimated blood loss 150 cc; placenta complete/[place]/intact; small second-degree midline laceration repaired with 2-0 chromic; fundus firm after delivery; no complications

## 2025-04-07 NOTE — ED PROVIDER NOTES
Encounter Date: 4/6/2025       History     Chief Complaint   Patient presents with    Rupture of Membranes     Patient is a 34-year-old G7 P 3-0 3 3 at 38 and 6/7 weeks gestational age who presents with regular painful contractions and leaking of amniotic fluid since earlier this afternoon; +fetal movement, no vaginal bleeding, +ruptured membranes, +regular painful contractions    Vital signs are stable and the patient is afebrile  Fetal heart tones in the 120s and reactive/category 1  Tocometer shows contractions every 2-4 minutes  Cervix-7/90/0        Review of patient's allergies indicates:   Allergen Reactions    Sulfa (sulfonamide antibiotics)      Past Medical History:   Diagnosis Date    Anemia      Past Surgical History:   Procedure Laterality Date    DILATION AND CURETTAGE OF UTERUS  2020    x2    REDUCTION OF BOTH BREASTS      REPAIR, LIGAMENT, KNEE, COLLATERAL, ACL, OR PCL       Family History   Problem Relation Name Age of Onset    Cancer Paternal Grandfather      Cancer Maternal Grandfather      Epilepsy Mother       Social History[1]  Review of Systems   Constitutional:  Positive for fatigue.   HENT:  Positive for congestion.    Eyes: Negative.    Respiratory: Negative.     Cardiovascular: Negative.  Negative for leg swelling.   Gastrointestinal:  Positive for abdominal pain. Negative for nausea and vomiting.   Endocrine: Negative.    Genitourinary:  Positive for frequency. Negative for pelvic pain and vaginal bleeding.   Musculoskeletal:  Negative for back pain.   Skin: Negative.    Neurological:  Negative for weakness and headaches.   Hematological: Negative.    Psychiatric/Behavioral: Negative.         Physical Exam     Initial Vitals [04/06/25 2228]   BP Pulse Resp Temp SpO2   125/84 84 18 97.9 °F (36.6 °C) 99 %      MAP       --         Physical Exam    Vitals reviewed.  Constitutional: She appears well-developed and well-nourished.   HENT:   Head: Normocephalic and atraumatic.   Eyes: EOM are  normal. Pupils are equal, round, and reactive to light.   Neck: Neck supple.   Normal range of motion.  Cardiovascular:  Normal rate and regular rhythm.           Pulmonary/Chest: Breath sounds normal.   Abdominal: Abdomen is soft.   Gravid uterus with no fundal tenderness   Genitourinary:    Vagina normal.      Genitourinary Comments: Cervix-7/90/0; +leaking of clear fluid     Musculoskeletal:         General: Normal range of motion.      Cervical back: Normal range of motion and neck supple.     Neurological: She is alert and oriented to person, place, and time. She has normal strength.   Skin: Skin is warm and dry.   Psychiatric: She has a normal mood and affect. Her behavior is normal.         ED Course   Procedures  Labs Reviewed   POCT ROM PLUS - Abnormal       Result Value    ROMPlus Test Positive (*)      Acceptable Yes            Imaging Results    None          Medications - No data to display  Medical Decision Making                                    Clinical Impression:  Assessment and plan)  1-intrauterine pregnancy at 38 and 6/7 weeks gestational age  2-active labor-will admit for delivery      Final diagnoses:  [O62.9] Labor abnormality, antepartum  [Z3A.38] 38 weeks gestation of pregnancy (Primary)  [O26.893, R10.9] Abdominal pain during pregnancy, third trimester          ED Disposition Condition    Send to L&D Stable                    [1]   Social History  Tobacco Use    Smoking status: Never     Passive exposure: Never    Smokeless tobacco: Never   Substance Use Topics    Alcohol use: Never    Drug use: Never        Alvaro Flor MD  04/06/25 9422

## 2025-04-07 NOTE — PLAN OF CARE
Problem:  Fall Injury Risk  Goal: Absence of Fall, Infant Drop and Related Injury  Outcome: Met     Problem: Adult Inpatient Plan of Care  Goal: Plan of Care Review  Outcome: Met  Goal: Patient-Specific Goal (Individualized)  Outcome: Met  Goal: Absence of Hospital-Acquired Illness or Injury  Outcome: Met  Goal: Optimal Comfort and Wellbeing  Outcome: Met  Goal: Readiness for Transition of Care  Outcome: Met     Problem: Infection  Goal: Absence of Infection Signs and Symptoms  Outcome: Met     Problem: Labor  Goal: Hemostasis  Outcome: Met  Goal: Stable Fetal Wellbeing  Outcome: Met  Goal: Effective Progression to Delivery  Outcome: Met  Goal: Absence of Infection Signs and Symptoms  Outcome: Met  Goal: Acceptable Pain Control  Outcome: Met  Goal: Normal Uterine Contraction Pattern  Outcome: Met

## 2025-04-08 ENCOUNTER — PATIENT MESSAGE (OUTPATIENT)
Dept: OBSTETRICS AND GYNECOLOGY | Facility: HOSPITAL | Age: 35
End: 2025-04-08
Payer: COMMERCIAL

## 2025-04-08 VITALS
HEIGHT: 62 IN | RESPIRATION RATE: 18 BRPM | DIASTOLIC BLOOD PRESSURE: 65 MMHG | HEART RATE: 86 BPM | TEMPERATURE: 98 F | BODY MASS INDEX: 27.27 KG/M2 | WEIGHT: 148.19 LBS | SYSTOLIC BLOOD PRESSURE: 94 MMHG | OXYGEN SATURATION: 96 %

## 2025-04-08 PROCEDURE — 99238 HOSP IP/OBS DSCHRG MGMT 30/<: CPT | Mod: ,,, | Performed by: STUDENT IN AN ORGANIZED HEALTH CARE EDUCATION/TRAINING PROGRAM

## 2025-04-08 PROCEDURE — 25000003 PHARM REV CODE 250: Performed by: OBSTETRICS & GYNECOLOGY

## 2025-04-08 RX ORDER — HYDROCODONE BITARTRATE AND ACETAMINOPHEN 5; 325 MG/1; MG/1
1 TABLET ORAL EVERY 6 HOURS PRN
Qty: 5 TABLET | Refills: 0 | Status: SHIPPED | OUTPATIENT
Start: 2025-04-08

## 2025-04-08 RX ORDER — IBUPROFEN 800 MG/1
800 TABLET ORAL EVERY 8 HOURS PRN
Qty: 90 TABLET | Refills: 0 | Status: SHIPPED | OUTPATIENT
Start: 2025-04-08

## 2025-04-08 RX ORDER — ACETAMINOPHEN 325 MG/1
650 TABLET ORAL EVERY 6 HOURS PRN
Status: DISCONTINUED | OUTPATIENT
Start: 2025-04-08 | End: 2025-04-08 | Stop reason: HOSPADM

## 2025-04-08 RX ADMIN — DOCUSATE SODIUM 200 MG: 100 CAPSULE, LIQUID FILLED ORAL at 09:04

## 2025-04-08 RX ADMIN — Medication 1 TABLET: at 09:04

## 2025-04-08 RX ADMIN — HYDROCORTISONE 2.5%: 25 CREAM TOPICAL at 08:04

## 2025-04-08 NOTE — PLAN OF CARE
Problem: Postpartum (Vaginal Delivery)  Goal: Successful Parent Role Transition  Outcome: Met  Goal: Hemostasis  Outcome: Met  Goal: Absence of Infection Signs and Symptoms  Outcome: Met  Goal: Anesthesia/Sedation Recovery  Outcome: Met  Goal: Optimal Pain Control and Function  Outcome: Met  Goal: Effective Urinary Elimination  Outcome: Met     Problem: Adult Inpatient Plan of Care  Goal: Plan of Care Review  Outcome: Met  Goal: Patient-Specific Goal (Individualized)  Outcome: Met  Goal: Absence of Hospital-Acquired Illness or Injury  Outcome: Met  Goal: Optimal Comfort and Wellbeing  Outcome: Met  Goal: Readiness for Transition of Care  Outcome: Met     Problem: Breastfeeding  Goal: Effective Breastfeeding  Outcome: Met

## 2025-04-08 NOTE — DISCHARGE INSTRUCTIONS
Topic Page  Nurse Date Time Comments   PP Education Booklet Given ---- ER 04/08/2025 Discharge booklet given and discussion on topics done.    Skin to skin 18 ER     Rooming in 29 ER     Importance of Exclusive Breastfeeding for 6 mo 35 ER     Bleeding 6 ER     Incision Care 10 ER     Sex 11 ER     Pain Management 8 ER     Perineal Care 9 ER     Minimizing Engorgement 40 ER     Collection & Storage of BM 42-43 ER     S/S of BF Problems  ER     Hand Expression 42 ER     Maternal s/s breast problems 41 ER     Mgnt of Common BF Problems (engorgement, sore & cracked nipples) 40-41 ER     PPD/Anxiety 14-15 ER

## 2025-04-09 ENCOUNTER — PATIENT MESSAGE (OUTPATIENT)
Dept: OBSTETRICS AND GYNECOLOGY | Facility: HOSPITAL | Age: 35
End: 2025-04-09
Payer: COMMERCIAL

## 2025-04-10 NOTE — DISCHARGE SUMMARY
"[facility] -  Labor and Delivery  Obstetrics  Discharge Summary      Patient Name: Clemencia Bedolla  MRN: [id number]  Admission Date: 2025  Hospital Length of Stay: 2 days  Discharge Date and Time: 2025 12:25 PM  Attending Physician: No att. providers found   Discharging Provider: Bruce Juan DO   Primary Care Provider: Kraig Mclaughlin NP    HPI: No notes on file        * No surgery found *     Hospital Course:   34 y.o.  at 38w6d presented with ctx. She ultimately delivered via . See delivery note for details.    Postpartum course was uncomplicated. Hgb stable. She met all postpartum milestones and was discharged on PPD2. Follow up outpatient with Dr. Hernandez.             Final Active Diagnoses:    Diagnosis Date Noted POA    PRINCIPAL PROBLEM:  Postpartum care following vaginal delivery [Z39.2] 2025 Not Applicable      Problems Resolved During this Admission:        Significant Diagnostic Studies: Labs: CBC No results for input(s): "WBC", "HGB", "HCT", "PLT" in the last 48 hours. and All labs within the past 24 hours have been reviewed      Feeding Method: breast    Immunizations       Date Immunization Status Dose Route/Site Given by    25 MMR Deleted 0.5 mL Subcutaneous/     25 Tdap Deleted 0.5 mL Intramuscular/     25 Rho (D) Immune Globulin - IM Deleted 300 mcg Intramuscular/             Delivery:    Episiotomy:     Lacerations: 1st   Repair suture:     Repair # of packets: 1   Blood loss (ml):       Birth information:  YOB: 2024   Time of birth: 11:29 PM   Sex: male   Delivery type: Vaginal, Spontaneous   Gestational Age: 38w6d     Measurements    Weight: 3457 g  Weight (lbs): 7 lb 9.9 oz  Length: 50.8 cm  Length (in): 20"         Delivery Clinician: Delivery Providers    Delivering clinician: Alvaro Flor MD          Additional  information:  Forceps:    Vacuum:    Breech:    Observed anomalies      Living?: "     Apgars    Living status: Living  Apgar Component Scores:  1 min.:  5 min.:  10 min.:  15 min.:  20 min.:    Skin color:  0  1       Heart rate:  2  2       Reflex irritability:  2  2       Muscle tone:  2  2       Respiratory effort:  2  2       Total:  8  9                Placenta: Delivered:       appearance  Pending Diagnostic Studies:       None            Discharged Condition: good    Disposition: Home or Self Care    Follow Up:   Follow-up Information       Bruce Hernandez,  Follow up in 3 week(s).    Specialty: Obstetrics and Gynecology  Why: postpartum  Contact information:  [address] [place] [id number]  [phone]                           Patient Instructions:      Pelvic Rest     Notify your health care provider if you experience any of the following:  temperature >100.4     Notify your health care provider if you experience any of the following:  persistent nausea and vomiting or diarrhea     Notify your health care provider if you experience any of the following:  severe uncontrolled pain     Notify your health care provider if you experience any of the following:  redness, tenderness, or signs of infection (pain, swelling, redness, odor or green/yellow discharge around incision site)     Notify your health care provider if you experience any of the following:  difficulty breathing or increased cough     Notify your health care provider if you experience any of the following:  severe persistent headache     Notify your health care provider if you experience any of the following:  worsening rash     Notify your health care provider if you experience any of the following:  persistent dizziness, light-headedness, or visual disturbances     Notify your health care provider if you experience any of the following:  increased confusion or weakness     Medications:  Discharge Medication List as of 2025 10:03 AM        START taking these medications    Details   HYDROcodone-acetaminophen  (NORCO) 5-325 mg per tablet Take 1 tablet by mouth every 6 (six) hours as needed. May cause drowsiness, Starting Tue 4/8/2025, Normal      ibuprofen (ADVIL,MOTRIN) 800 MG tablet Take 1 tablet by mouth every 8 (eight) hours as needed (cramping). Take with food, Starting Tue 4/8/2025, Normal           CONTINUE these medications which have NOT CHANGED    Details   prenatal vit/iron fum/folic ac (PRENATAL 1+1 ORAL) Take by mouth., Historical Med           STOP taking these medications       blood sugar diagnostic (BLOOD GLUCOSE TEST) Strp Comments:   Reason for Stopping:         blood-glucose meter (FREESTYLE SYSTEM KIT) kit Comments:   Reason for Stopping:         lancets (FREESTYLE LANCETS) 28 gauge Misc Comments:   Reason for Stopping:               Bruce Juan, DO  Obstetrics  [facility] -  Labor and Delivery

## 2025-04-10 NOTE — HOSPITAL COURSE
34 y.o.  at 38w6d presented with ctx. She ultimately delivered via . See delivery note for details.    Postpartum course was uncomplicated. Hgb stable. She met all postpartum milestones and was discharged on PPD2. Follow up outpatient with Dr. Hernandez.

## 2025-04-16 ENCOUNTER — TELEPHONE (OUTPATIENT)
Dept: OBSTETRICS AND GYNECOLOGY | Facility: CLINIC | Age: 35
End: 2025-04-16
Payer: COMMERCIAL

## 2025-04-16 RX ORDER — FLUCONAZOLE 200 MG/1
TABLET ORAL
Qty: 16 TABLET | Refills: 0 | Status: SHIPPED | OUTPATIENT
Start: 2025-04-16 | End: 2025-05-01

## 2025-04-16 NOTE — TELEPHONE ENCOUNTER
----- Message from Yanira sent at 4/16/2025  1:17 PM CDT -----  Who Called: Clemencia REGAN DonnellsSymptoms (please be specific): thrush  How long has patient had these symptoms:  yesterday List of preferred pharmacies on file (remove unneeded): [facility] - [place], [place] - [address] [address] [address] [facility] [address]Rxcfv: [phone] Fax: [phone] Method of Contact: Phone CallPatient's Preferred Phone Number on File: [phone] Additional Information: pt needs some medication for this diagnosis. please contact pt for more info .

## 2025-04-27 ENCOUNTER — PATIENT MESSAGE (OUTPATIENT)
Dept: OBSTETRICS AND GYNECOLOGY | Facility: CLINIC | Age: 35
End: 2025-04-27
Payer: COMMERCIAL

## 2025-04-30 ENCOUNTER — POSTPARTUM VISIT (OUTPATIENT)
Dept: OBSTETRICS AND GYNECOLOGY | Facility: CLINIC | Age: 35
End: 2025-04-30
Payer: COMMERCIAL

## 2025-04-30 ENCOUNTER — PATIENT MESSAGE (OUTPATIENT)
Dept: OBSTETRICS AND GYNECOLOGY | Facility: CLINIC | Age: 35
End: 2025-04-30
Payer: COMMERCIAL

## 2025-04-30 VITALS
DIASTOLIC BLOOD PRESSURE: 61 MMHG | SYSTOLIC BLOOD PRESSURE: 102 MMHG | BODY MASS INDEX: 24.14 KG/M2 | HEART RATE: 65 BPM | WEIGHT: 132 LBS

## 2025-04-30 PROCEDURE — 99213 OFFICE O/P EST LOW 20 MIN: CPT | Mod: PBBFAC | Performed by: STUDENT IN AN ORGANIZED HEALTH CARE EDUCATION/TRAINING PROGRAM

## 2025-04-30 PROCEDURE — 0503F POSTPARTUM CARE VISIT: CPT | Mod: ,,, | Performed by: STUDENT IN AN ORGANIZED HEALTH CARE EDUCATION/TRAINING PROGRAM

## 2025-04-30 PROCEDURE — 99999 PR PBB SHADOW E&M-EST. PATIENT-LVL III: CPT | Mod: PBBFAC,,, | Performed by: STUDENT IN AN ORGANIZED HEALTH CARE EDUCATION/TRAINING PROGRAM

## 2025-04-30 NOTE — PROGRESS NOTES
Subjective:      Clemencia Bedolla is a 34 y.o. female who presents for a postpartum visit. She is 3 weeks postpartum.  Outcome: spontaneous vaginal delivery. Anesthesia: none. Postpartum course has been uncomplicated. Baby's course has been uncomplicated. Baby is feeding by both breast and bottle. Bleeding staining only. Bowel function is normal. Bladder function is normal. Patient is not sexually active. Contraception method is abstinence. Postpartum depression screening: negative.    The following portions of the patient's history were reviewed and updated as appropriate: allergies, current medications, past family history, past medical history, past social history, past surgical history, and problem list.    Review of Systems  Pertinent items are noted in HPI.     Objective:     Vitals:    04/30/25 0914   BP: 102/61   Pulse: 65       General:  alert, appears stated age, and cooperative    Breasts:  deferred   Lungs: Unlabored respirations          Assessment:      Normal postpartum exam.     Plan:      1. Contraception: abstinence  2. Follow up in: 3 weeks or as needed.     Alyse H. Boswell, DO FACOOG [facility]

## 2025-05-12 RX ORDER — CEPHALEXIN 500 MG/1
500 CAPSULE ORAL EVERY 6 HOURS
Qty: 40 CAPSULE | Refills: 0 | Status: SHIPPED | OUTPATIENT
Start: 2025-05-12 | End: 2025-05-22

## 2025-05-20 ENCOUNTER — TELEPHONE (OUTPATIENT)
Dept: OBSTETRICS AND GYNECOLOGY | Facility: CLINIC | Age: 35
End: 2025-05-20
Payer: COMMERCIAL

## 2025-06-02 ENCOUNTER — POSTPARTUM VISIT (OUTPATIENT)
Dept: OBSTETRICS AND GYNECOLOGY | Facility: CLINIC | Age: 35
End: 2025-06-02
Payer: COMMERCIAL

## 2025-06-02 VITALS
SYSTOLIC BLOOD PRESSURE: 115 MMHG | WEIGHT: 132 LBS | HEART RATE: 63 BPM | BODY MASS INDEX: 24.14 KG/M2 | DIASTOLIC BLOOD PRESSURE: 64 MMHG

## 2025-06-02 PROCEDURE — 99213 OFFICE O/P EST LOW 20 MIN: CPT | Mod: PBBFAC | Performed by: STUDENT IN AN ORGANIZED HEALTH CARE EDUCATION/TRAINING PROGRAM

## 2025-06-02 PROCEDURE — 0503F POSTPARTUM CARE VISIT: CPT | Mod: ,,, | Performed by: STUDENT IN AN ORGANIZED HEALTH CARE EDUCATION/TRAINING PROGRAM

## 2025-06-02 PROCEDURE — 99999 PR PBB SHADOW E&M-EST. PATIENT-LVL III: CPT | Mod: PBBFAC,,, | Performed by: STUDENT IN AN ORGANIZED HEALTH CARE EDUCATION/TRAINING PROGRAM

## 2025-06-02 RX ORDER — NORETHINDRONE 0.35 MG/1
1 TABLET ORAL DAILY
Qty: 30 TABLET | Refills: 11 | Status: SHIPPED | OUTPATIENT
Start: 2025-06-02 | End: 2026-06-02

## 2025-06-09 NOTE — PROGRESS NOTES
Subjective:      Clemencia Bedolla is a 35 y.o. female who presents for a postpartum visit. She is 8 weeks postpartum. The delivery was at 38 gestational weeks. Outcome: spontaneous vaginal delivery. Anesthesia: epidural. Postpartum course has been uncomplicated. Baby's course has been uncomplicated. Baby is feeding by breast. Bleeding no bleeding. Bowel function is normal. Bladder function is normal. Patient is not sexually active. Contraception method is abstinence. Postpartum depression screening: negative.    The following portions of the patient's history were reviewed and updated as appropriate: allergies, current medications, past family history, past medical history, past social history, past surgical history, and problem list.    Review of Systems  Pertinent items are noted in HPI.     Objective:     Vitals:    06/02/25 1140   BP: 115/64   Pulse: 63       General:  alert, appears stated age, and cooperative    Breasts:  deferred   Lungs: Unlabored respirations          Assessment:      Normal postpartum exam.     Plan:      1. Contraception: oral progesterone-only contraceptive  2.  Follow up in: 6 months or as needed.